# Patient Record
Sex: FEMALE | Race: WHITE | Employment: OTHER | URBAN - METROPOLITAN AREA
[De-identification: names, ages, dates, MRNs, and addresses within clinical notes are randomized per-mention and may not be internally consistent; named-entity substitution may affect disease eponyms.]

---

## 2017-03-21 ENCOUNTER — HOSPITAL ENCOUNTER (OUTPATIENT)
Dept: ULTRASOUND IMAGING | Age: 67
Discharge: HOME | End: 2017-03-21
Attending: OPHTHALMOLOGY
Payer: MEDICARE

## 2017-03-21 DIAGNOSIS — H34.232 RETINAL ARTERIAL BRANCH OCCLUSION, LEFT: ICD-10-CM

## 2017-03-21 PROCEDURE — 93880 EXTRACRANIAL BILAT STUDY: CPT

## 2017-03-22 ENCOUNTER — LAB ENCOUNTER (OUTPATIENT)
Dept: LAB | Facility: HOSPITAL | Age: 67
End: 2017-03-22
Attending: OPHTHALMOLOGY
Payer: MEDICARE

## 2017-03-22 ENCOUNTER — HOSPITAL ENCOUNTER (OUTPATIENT)
Dept: CV DIAGNOSTICS | Facility: HOSPITAL | Age: 67
Discharge: HOME | End: 2017-03-22
Attending: OPHTHALMOLOGY
Payer: MEDICARE

## 2017-03-22 DIAGNOSIS — H34.232 BRANCH RETINAL ARTERY OCCLUSION OF LEFT EYE: Primary | ICD-10-CM

## 2017-03-22 DIAGNOSIS — H34.232 RETINAL ARTERIAL BRANCH OCCLUSION, LEFT: ICD-10-CM

## 2017-03-22 LAB
BASOPHILS # BLD AUTO: 0.06 X10(3) UL (ref 0–0.1)
BASOPHILS NFR BLD AUTO: 0.8 %
C-REACTIVE PROTEIN: 0.79 MG/DL (ref ?–1)
EOSINOPHIL # BLD AUTO: 0.16 X10(3) UL (ref 0–0.3)
EOSINOPHIL NFR BLD AUTO: 2.2 %
ERYTHROCYTE [DISTWIDTH] IN BLOOD BY AUTOMATED COUNT: 14.2 % (ref 11.5–16)
HCT VFR BLD AUTO: 40.6 % (ref 34–50)
HGB BLD-MCNC: 13.8 G/DL (ref 12–16)
IMMATURE GRANULOCYTE COUNT: 0.02 X10(3) UL (ref 0–1)
IMMATURE GRANULOCYTE RATIO %: 0.3 %
LYMPHOCYTES # BLD AUTO: 1.23 X10(3) UL (ref 0.9–4)
LYMPHOCYTES NFR BLD AUTO: 17 %
MCH RBC QN AUTO: 32.4 PG (ref 27–33.2)
MCHC RBC AUTO-ENTMCNC: 34 G/DL (ref 31–37)
MCV RBC AUTO: 95.3 FL (ref 81–100)
MONOCYTES # BLD AUTO: 0.61 X10(3) UL (ref 0.1–0.6)
MONOCYTES NFR BLD AUTO: 8.4 %
NEUTROPHIL ABS PRELIM: 5.14 X10 (3) UL (ref 1.3–6.7)
NEUTROPHILS # BLD AUTO: 5.14 X10(3) UL (ref 1.3–6.7)
NEUTROPHILS NFR BLD AUTO: 71.3 %
PLATELET # BLD AUTO: 243 10(3)UL (ref 150–450)
RBC # BLD AUTO: 4.26 X10(6)UL (ref 3.8–5.1)
RED CELL DISTRIBUTION WIDTH-SD: 49.5 FL (ref 35.1–46.3)
SED RATE-ML: 64 MM/HR (ref 0–25)
WBC # BLD AUTO: 7.2 X10(3) UL (ref 4–13)

## 2017-03-22 PROCEDURE — 93306 TTE W/DOPPLER COMPLETE: CPT

## 2017-03-22 PROCEDURE — 85025 COMPLETE CBC W/AUTO DIFF WBC: CPT

## 2017-03-22 PROCEDURE — 86140 C-REACTIVE PROTEIN: CPT

## 2017-03-22 PROCEDURE — 93306 TTE W/DOPPLER COMPLETE: CPT | Performed by: INTERNAL MEDICINE

## 2017-03-22 PROCEDURE — 85652 RBC SED RATE AUTOMATED: CPT

## 2017-03-22 PROCEDURE — 36415 COLL VENOUS BLD VENIPUNCTURE: CPT

## 2020-09-13 ENCOUNTER — APPOINTMENT (OUTPATIENT)
Dept: CT IMAGING | Age: 70
DRG: 175 | End: 2020-09-13
Attending: EMERGENCY MEDICINE
Payer: MEDICARE

## 2020-09-13 ENCOUNTER — APPOINTMENT (OUTPATIENT)
Dept: CV DIAGNOSTICS | Facility: HOSPITAL | Age: 70
DRG: 175 | End: 2020-09-13
Attending: HOSPITALIST
Payer: MEDICARE

## 2020-09-13 ENCOUNTER — APPOINTMENT (OUTPATIENT)
Dept: NUCLEAR MEDICINE | Facility: HOSPITAL | Age: 70
DRG: 175 | End: 2020-09-13
Attending: INTERNAL MEDICINE
Payer: MEDICARE

## 2020-09-13 ENCOUNTER — APPOINTMENT (OUTPATIENT)
Dept: GENERAL RADIOLOGY | Facility: HOSPITAL | Age: 70
DRG: 175 | End: 2020-09-13
Attending: INTERNAL MEDICINE
Payer: MEDICARE

## 2020-09-13 ENCOUNTER — APPOINTMENT (OUTPATIENT)
Dept: ULTRASOUND IMAGING | Facility: HOSPITAL | Age: 70
DRG: 175 | End: 2020-09-13
Attending: HOSPITALIST
Payer: MEDICARE

## 2020-09-13 ENCOUNTER — HOSPITAL ENCOUNTER (INPATIENT)
Facility: HOSPITAL | Age: 70
LOS: 8 days | Discharge: HOME | DRG: 175 | End: 2020-09-21
Attending: EMERGENCY MEDICINE | Admitting: INTERNAL MEDICINE
Payer: MEDICARE

## 2020-09-13 DIAGNOSIS — I26.09 OTHER ACUTE PULMONARY EMBOLISM WITH ACUTE COR PULMONALE (HCC): ICD-10-CM

## 2020-09-13 DIAGNOSIS — R79.89 POSITIVE D DIMER: ICD-10-CM

## 2020-09-13 DIAGNOSIS — E66.01 MORBID OBESITY WITH BMI OF 50.0-59.9, ADULT (HCC): ICD-10-CM

## 2020-09-13 DIAGNOSIS — R07.9 CHEST PAIN OF UNCERTAIN ETIOLOGY: Primary | ICD-10-CM

## 2020-09-13 DIAGNOSIS — R77.8 ELEVATED TROPONIN: ICD-10-CM

## 2020-09-13 LAB
ALBUMIN SERPL-MCNC: 3.2 G/DL (ref 3.4–5)
ALBUMIN/GLOB SERPL: 0.6 {RATIO} (ref 1–2)
ALP LIVER SERPL-CCNC: 119 U/L (ref 55–142)
ALT SERPL-CCNC: 41 U/L (ref 13–56)
ANION GAP SERPL CALC-SCNC: 13 MMOL/L (ref 0–18)
ANION GAP SERPL CALC-SCNC: 3 MMOL/L (ref 0–18)
APTT PPP: 119.6 SECONDS (ref 25.4–36.1)
APTT PPP: 266.5 SECONDS (ref 25.4–36.1)
APTT PPP: 28.2 SECONDS (ref 25.4–36.1)
AST SERPL-CCNC: 38 U/L (ref 15–37)
ATRIAL RATE: 106 BPM
ATRIAL RATE: 96 BPM
BASOPHILS # BLD AUTO: 0.04 X10(3) UL (ref 0–0.2)
BASOPHILS NFR BLD AUTO: 0.3 %
BILIRUB SERPL-MCNC: 0.7 MG/DL (ref 0.1–2)
BUN BLD-MCNC: 18 MG/DL (ref 7–18)
BUN BLD-MCNC: 21 MG/DL (ref 7–18)
BUN/CREAT SERPL: 16.5 (ref 10–20)
BUN/CREAT SERPL: 17.8 (ref 10–20)
CALCIUM BLD-MCNC: 9.2 MG/DL (ref 8.5–10.1)
CALCIUM BLD-MCNC: 9.4 MG/DL (ref 8.5–10.1)
CHLORIDE SERPL-SCNC: 100 MMOL/L (ref 98–112)
CHLORIDE SERPL-SCNC: 101 MMOL/L (ref 98–112)
CHOLEST SMN-MCNC: 172 MG/DL (ref ?–200)
CO2 SERPL-SCNC: 22 MMOL/L (ref 21–32)
CO2 SERPL-SCNC: 27 MMOL/L (ref 21–32)
CREAT BLD-MCNC: 1.01 MG/DL (ref 0.55–1.02)
CREAT BLD-MCNC: 1.27 MG/DL (ref 0.55–1.02)
D-DIMER: >20 UG/ML FEU (ref ?–0.72)
DEPRECATED RDW RBC AUTO: 49 FL (ref 35.1–46.3)
DEPRECATED RDW RBC AUTO: 50.1 FL (ref 35.1–46.3)
EOSINOPHIL # BLD AUTO: 0.09 X10(3) UL (ref 0–0.7)
EOSINOPHIL NFR BLD AUTO: 0.7 %
ERYTHROCYTE [DISTWIDTH] IN BLOOD BY AUTOMATED COUNT: 14 % (ref 11–15)
ERYTHROCYTE [DISTWIDTH] IN BLOOD BY AUTOMATED COUNT: 14 % (ref 11–15)
GLOBULIN PLAS-MCNC: 5.2 G/DL (ref 2.8–4.4)
GLUCOSE BLD-MCNC: 131 MG/DL (ref 70–99)
GLUCOSE BLD-MCNC: 151 MG/DL (ref 70–99)
HCT VFR BLD AUTO: 42.5 % (ref 35–48)
HCT VFR BLD AUTO: 42.7 % (ref 35–48)
HDLC SERPL-MCNC: 59 MG/DL (ref 40–59)
HGB BLD-MCNC: 13.8 G/DL (ref 12–16)
HGB BLD-MCNC: 14.1 G/DL (ref 12–16)
IMM GRANULOCYTES # BLD AUTO: 0.06 X10(3) UL (ref 0–1)
IMM GRANULOCYTES NFR BLD: 0.5 %
LDLC SERPL CALC-MCNC: 93 MG/DL (ref ?–100)
LYMPHOCYTES # BLD AUTO: 1.94 X10(3) UL (ref 1–4)
LYMPHOCYTES NFR BLD AUTO: 15.5 %
M PROTEIN MFR SERPL ELPH: 8.4 G/DL (ref 6.4–8.2)
MCH RBC QN AUTO: 31.4 PG (ref 26–34)
MCH RBC QN AUTO: 31.5 PG (ref 26–34)
MCHC RBC AUTO-ENTMCNC: 32.5 G/DL (ref 31–37)
MCHC RBC AUTO-ENTMCNC: 33 G/DL (ref 31–37)
MCV RBC AUTO: 95.5 FL (ref 80–100)
MCV RBC AUTO: 96.8 FL (ref 80–100)
MONOCYTES # BLD AUTO: 0.66 X10(3) UL (ref 0.1–1)
MONOCYTES NFR BLD AUTO: 5.3 %
NEUTROPHILS # BLD AUTO: 9.75 X10 (3) UL (ref 1.5–7.7)
NEUTROPHILS # BLD AUTO: 9.75 X10(3) UL (ref 1.5–7.7)
NEUTROPHILS NFR BLD AUTO: 77.7 %
NONHDLC SERPL-MCNC: 113 MG/DL (ref ?–130)
NT-PROBNP SERPL-MCNC: 2585 PG/ML (ref ?–125)
OSMOLALITY SERPL CALC.SUM OF ELEC: 277 MOSM/KG (ref 275–295)
OSMOLALITY SERPL CALC.SUM OF ELEC: 285 MOSM/KG (ref 275–295)
P AXIS: 43 DEGREES
P AXIS: 45 DEGREES
P-R INTERVAL: 202 MS
P-R INTERVAL: 212 MS
PLATELET # BLD AUTO: 197 10(3)UL (ref 150–450)
PLATELET # BLD AUTO: 236 10(3)UL (ref 150–450)
POTASSIUM SERPL-SCNC: 3.6 MMOL/L (ref 3.5–5.1)
POTASSIUM SERPL-SCNC: 4.4 MMOL/L (ref 3.5–5.1)
POTASSIUM SERPL-SCNC: 4.9 MMOL/L (ref 3.5–5.1)
Q-T INTERVAL: 324 MS
Q-T INTERVAL: 370 MS
QRS DURATION: 88 MS
QRS DURATION: 90 MS
QTC CALCULATION (BEZET): 430 MS
QTC CALCULATION (BEZET): 467 MS
R AXIS: 55 DEGREES
R AXIS: 88 DEGREES
RBC # BLD AUTO: 4.39 X10(6)UL (ref 3.8–5.3)
RBC # BLD AUTO: 4.47 X10(6)UL (ref 3.8–5.3)
SARS-COV-2 RNA RESP QL NAA+PROBE: NOT DETECTED
SODIUM SERPL-SCNC: 131 MMOL/L (ref 136–145)
SODIUM SERPL-SCNC: 135 MMOL/L (ref 136–145)
T AXIS: -3 DEGREES
T AXIS: 10 DEGREES
TRIGL SERPL-MCNC: 101 MG/DL (ref 30–149)
TROPONIN I SERPL-MCNC: 0.82 NG/ML (ref ?–0.04)
VENTRICULAR RATE: 106 BPM
VENTRICULAR RATE: 96 BPM
VLDLC SERPL CALC-MCNC: 20 MG/DL (ref 0–30)
WBC # BLD AUTO: 12.5 X10(3) UL (ref 4–11)
WBC # BLD AUTO: 15.4 X10(3) UL (ref 4–11)

## 2020-09-13 PROCEDURE — 93306 TTE W/DOPPLER COMPLETE: CPT | Performed by: HOSPITALIST

## 2020-09-13 PROCEDURE — 93970 EXTREMITY STUDY: CPT | Performed by: HOSPITALIST

## 2020-09-13 PROCEDURE — 78580 LUNG PERFUSION IMAGING: CPT | Performed by: INTERNAL MEDICINE

## 2020-09-13 PROCEDURE — 99223 1ST HOSP IP/OBS HIGH 75: CPT | Performed by: INTERNAL MEDICINE

## 2020-09-13 PROCEDURE — 71250 CT THORAX DX C-: CPT | Performed by: EMERGENCY MEDICINE

## 2020-09-13 PROCEDURE — 99223 1ST HOSP IP/OBS HIGH 75: CPT | Performed by: HOSPITALIST

## 2020-09-13 PROCEDURE — 71045 X-RAY EXAM CHEST 1 VIEW: CPT | Performed by: INTERNAL MEDICINE

## 2020-09-13 RX ORDER — HYDROCHLOROTHIAZIDE 25 MG/1
12.5 TABLET ORAL DAILY
Status: DISCONTINUED | OUTPATIENT
Start: 2020-09-13 | End: 2020-09-14 | Stop reason: SDUPTHER

## 2020-09-13 RX ORDER — MULTIVIT WITH MINERALS/LUTEIN
1000 TABLET ORAL DAILY
COMMUNITY

## 2020-09-13 RX ORDER — IRBESARTAN 300 MG/1
TABLET ORAL NIGHTLY
Status: ON HOLD | COMMUNITY
End: 2020-09-14

## 2020-09-13 RX ORDER — CHLORAL HYDRATE 500 MG
1000 CAPSULE ORAL DAILY
COMMUNITY
End: 2020-10-16

## 2020-09-13 RX ORDER — ONDANSETRON 2 MG/ML
4 INJECTION INTRAMUSCULAR; INTRAVENOUS EVERY 4 HOURS PRN
Status: DISCONTINUED | OUTPATIENT
Start: 2020-09-13 | End: 2020-09-13

## 2020-09-13 RX ORDER — SODIUM CHLORIDE 9 MG/ML
INJECTION, SOLUTION INTRAVENOUS CONTINUOUS
Status: ACTIVE | OUTPATIENT
Start: 2020-09-13 | End: 2020-09-13

## 2020-09-13 RX ORDER — MV,CALC,IRON,MIN/FOLIC/HERB153 3MG-133-33
1 CAPSULE ORAL DAILY
Status: ON HOLD | COMMUNITY
End: 2020-09-21

## 2020-09-13 RX ORDER — ONDANSETRON 2 MG/ML
4 INJECTION INTRAMUSCULAR; INTRAVENOUS EVERY 6 HOURS PRN
Status: DISCONTINUED | OUTPATIENT
Start: 2020-09-13 | End: 2020-09-21

## 2020-09-13 RX ORDER — HEPARIN SODIUM AND DEXTROSE 10000; 5 [USP'U]/100ML; G/100ML
18 INJECTION INTRAVENOUS ONCE
Status: COMPLETED | OUTPATIENT
Start: 2020-09-13 | End: 2020-09-13

## 2020-09-13 RX ORDER — ACETAMINOPHEN 325 MG/1
650 TABLET ORAL EVERY 6 HOURS PRN
Status: DISCONTINUED | OUTPATIENT
Start: 2020-09-13 | End: 2020-09-21

## 2020-09-13 RX ORDER — MULTIVITAMIN
1 TABLET ORAL DAILY
Status: ON HOLD | COMMUNITY
End: 2020-09-21

## 2020-09-13 RX ORDER — POTASSIUM CHLORIDE 20 MEQ/1
40 TABLET, EXTENDED RELEASE ORAL EVERY 4 HOURS
Status: COMPLETED | OUTPATIENT
Start: 2020-09-13 | End: 2020-09-13

## 2020-09-13 RX ORDER — HEPARIN SODIUM AND DEXTROSE 10000; 5 [USP'U]/100ML; G/100ML
INJECTION INTRAVENOUS CONTINUOUS
Status: DISCONTINUED | OUTPATIENT
Start: 2020-09-13 | End: 2020-09-14

## 2020-09-13 RX ORDER — LOSARTAN POTASSIUM 100 MG/1
100 TABLET ORAL DAILY
Status: DISCONTINUED | OUTPATIENT
Start: 2020-09-13 | End: 2020-09-14

## 2020-09-13 RX ORDER — CHOLECALCIFEROL (VITAMIN D3) 50 MCG
5000 TABLET ORAL
COMMUNITY
End: 2020-10-16

## 2020-09-13 RX ORDER — HEPARIN SODIUM 5000 [USP'U]/ML
80 INJECTION INTRAVENOUS; SUBCUTANEOUS ONCE
Status: COMPLETED | OUTPATIENT
Start: 2020-09-13 | End: 2020-09-13

## 2020-09-13 RX ORDER — ASPIRIN 81 MG/1
TABLET, CHEWABLE ORAL DAILY
Status: ON HOLD | COMMUNITY
End: 2020-09-21

## 2020-09-13 RX ORDER — ASPIRIN 81 MG/1
324 TABLET, CHEWABLE ORAL ONCE
Status: COMPLETED | OUTPATIENT
Start: 2020-09-13 | End: 2020-09-13

## 2020-09-13 NOTE — PLAN OF CARE
Patient is A&Ox4. Lung sounds clear bilaterally, S1 and S2 present. K replacement protocol started at 11:15 today for K 3.6.  Multiple attempts to start IV on left arm unsuccessful, only IV access is an 18G on the right wrist. Anesthesia is coming by to hel electrolytes and administer replacement therapy as ordered  Outcome: Progressing     Problem: RESPIRATORY - ADULT  Goal: Achieves optimal ventilation and oxygenation  Description  INTERVENTIONS:  - Assess for changes in respiratory status  - Assess for uzma

## 2020-09-13 NOTE — PLAN OF CARE
ED admit [address] ED. COVID swab walked to lab. Isolation precautions initiated. Upon arrival patient is alert, oriented x4. Forgetful. Pt arrives on heparin gtt. O2 sats > 92% on RA, 97% on 3L, pt winded at rest. SR/ST on the monitor. Denies pain.

## 2020-09-13 NOTE — CONSULTS
[facility]    Inpatient Cardiology Consultation Note    Rylie Human Patient Status:  Inpatient    1950 MRN [id number]   [facility] 8NE-A Attending Ponce Zayas MD   Hosp Day # 0 PCP DO LYNDSEY Barrera formally review echo and discuss options for medical therapy versus lysis    Christine GIL/ARCHIE Cardiology    --------------------------------------------------------------------------------------------------------------------------------  10 (VITAMIN D) 50 MCG (2000 UT) Oral Tab, Take 5,000 mg by mouth. omega-3 fatty acids 1000 MG Oral Cap, Take 1,000 mg by mouth daily. Multiple Vitamins-Minerals (BIO-35 GLUTEN-FREE) Oral Cap, Take by mouth.   Chromium 200 MCG Oral Cap, Take 200 mcg by mouth

## 2020-09-13 NOTE — ED NOTES
While in CT, pt's IV fell out while she was moving around. Writer applied gauze to site upon her return.

## 2020-09-13 NOTE — ED INITIAL ASSESSMENT (HPI)
Left-sided chest pain radiating to her back, shortness of breath, dry cough, weakness, all sxs onset yesterday morning.

## 2020-09-13 NOTE — PROGRESS NOTES
Patient seen and examined. Patient admitted for presumed PE on heparin drip now and stable. H&P from this am reviewed. She is on 3L NC, stable. Still with MENENDEZ/no CP now. On heparin drip.      Echo pending   US legs pending   CTA chest pending

## 2020-09-13 NOTE — ED PROVIDER NOTES
Patient Seen in: THE [facility] Emergency Department In [place]      History   Patient presents with:  Chest Pain Angina  Cough/URI  Fatigue    Stated Complaint: Left-sided chest pain radiating to her back, shortness of breath, dry cough, we*    HPI    Patien above.    Physical Exam     ED Triage Vitals [09/13/20 0141]   /81   Pulse 105   Resp 22   Temp 97.1 °F (36.2 °C)   Temp src Temporal   SpO2 95 %   O2 Device None (Room air)       Current:/77   Pulse 98   Temp 97.1 °F (36.2 °C) (Temporal)   Res orders were created for panel order CBC WITH DIFFERENTIAL WITH PLATELET.   Procedure                               Abnormality         Status                     ---------                               -----------         ------                     CBC W/ D Medication List                       Present on Admission           ICD-10-CM Noted POA    Chest pain of uncertain etiology S22.43 9/13/2020 Unknown

## 2020-09-13 NOTE — H&P
[facility]  History and Physical     [facility] Patient Status:  Emergency    1950 MRN [id number]   Location [facility] Attending Shaji Fitzpatrick MD   Hosp Day # 0 PCP DO Emelyn Julio Propionate (FLONASE NA), by Nasal route., Disp: , Rfl:         Review of Systems:   A comprehensive 14 point review of systems was completed. Pertinent positives and negatives noted in the HPI.     Physical Exam:    /77   Pulse 98   Temp 97.1 °F (3 3. Hyponatremia  1. Monitor   4. H/o breast cancer  5. Morbid obesity  1. BMI 50    Quality:  · DVT Prophylaxis: Heparin gtt  · CODE status: Full  · Ritter: No    Plan of care discussed with patient.      Jeffery Lorenzana DO  9/13/2020

## 2020-09-14 ENCOUNTER — APPOINTMENT (OUTPATIENT)
Dept: ULTRASOUND IMAGING | Facility: HOSPITAL | Age: 70
DRG: 175 | End: 2020-09-14
Attending: INTERNAL MEDICINE
Payer: MEDICARE

## 2020-09-14 ENCOUNTER — APPOINTMENT (OUTPATIENT)
Dept: INTERVENTIONAL RADIOLOGY/VASCULAR | Facility: HOSPITAL | Age: 70
DRG: 175 | End: 2020-09-14
Attending: NURSE PRACTITIONER
Payer: MEDICARE

## 2020-09-14 LAB
ALBUMIN SERPL-MCNC: 2.9 G/DL (ref 3.4–5)
ALBUMIN/GLOB SERPL: 0.6 {RATIO} (ref 1–2)
ALP LIVER SERPL-CCNC: 104 U/L (ref 55–142)
ALT SERPL-CCNC: 33 U/L (ref 13–56)
ANION GAP SERPL CALC-SCNC: 7 MMOL/L (ref 0–18)
APTT PPP: 112.3 SECONDS (ref 25.4–36.1)
APTT PPP: 191.3 SECONDS (ref 25.4–36.1)
APTT PPP: 36.5 SECONDS (ref 25.4–36.1)
AST SERPL-CCNC: 29 U/L (ref 15–37)
BILIRUB SERPL-MCNC: 0.9 MG/DL (ref 0.1–2)
BUN BLD-MCNC: 26 MG/DL (ref 7–18)
BUN/CREAT SERPL: 18.1 (ref 10–20)
CALCIUM BLD-MCNC: 8.9 MG/DL (ref 8.5–10.1)
CHLORIDE SERPL-SCNC: 98 MMOL/L (ref 98–112)
CO2 SERPL-SCNC: 24 MMOL/L (ref 21–32)
CREAT BLD-MCNC: 1.44 MG/DL (ref 0.55–1.02)
DEPRECATED RDW RBC AUTO: 49.1 FL (ref 35.1–46.3)
DEPRECATED RDW RBC AUTO: 49.4 FL (ref 35.1–46.3)
ERYTHROCYTE [DISTWIDTH] IN BLOOD BY AUTOMATED COUNT: 13.9 % (ref 11–15)
ERYTHROCYTE [DISTWIDTH] IN BLOOD BY AUTOMATED COUNT: 14 % (ref 11–15)
FIBRINOGEN: 555 MG/DL (ref 200–446)
GLOBULIN PLAS-MCNC: 5.1 G/DL (ref 2.8–4.4)
GLUCOSE BLD-MCNC: 96 MG/DL (ref 70–99)
HCT VFR BLD AUTO: 37.5 % (ref 35–48)
HCT VFR BLD AUTO: 38.9 % (ref 35–48)
HGB BLD-MCNC: 12.3 G/DL (ref 12–16)
HGB BLD-MCNC: 12.8 G/DL (ref 12–16)
INR BLD: 1.16 (ref 0.86–1.11)
M PROTEIN MFR SERPL ELPH: 8 G/DL (ref 6.4–8.2)
MCH RBC QN AUTO: 31.5 PG (ref 26–34)
MCH RBC QN AUTO: 31.8 PG (ref 26–34)
MCHC RBC AUTO-ENTMCNC: 32.8 G/DL (ref 31–37)
MCHC RBC AUTO-ENTMCNC: 32.9 G/DL (ref 31–37)
MCV RBC AUTO: 95.9 FL (ref 80–100)
MCV RBC AUTO: 96.5 FL (ref 80–100)
OSMOLALITY SERPL CALC.SUM OF ELEC: 273 MOSM/KG (ref 275–295)
PLATELET # BLD AUTO: 189 10(3)UL (ref 150–450)
PLATELET # BLD AUTO: 207 10(3)UL (ref 150–450)
POTASSIUM SERPL-SCNC: 4.2 MMOL/L (ref 3.5–5.1)
PSA SERPL DL<=0.01 NG/ML-MCNC: 15 SECONDS (ref 12.1–14.7)
RBC # BLD AUTO: 3.91 X10(6)UL (ref 3.8–5.3)
RBC # BLD AUTO: 4.03 X10(6)UL (ref 3.8–5.3)
SODIUM SERPL-SCNC: 129 MMOL/L (ref 136–145)
WBC # BLD AUTO: 10.7 X10(3) UL (ref 4–11)
WBC # BLD AUTO: 12 X10(3) UL (ref 4–11)

## 2020-09-14 PROCEDURE — 05H633Z INSERTION OF INFUSION DEVICE INTO LEFT SUBCLAVIAN VEIN, PERCUTANEOUS APPROACH: ICD-10-PCS | Performed by: HOSPITALIST

## 2020-09-14 PROCEDURE — 02HQ33Z INSERTION OF INFUSION DEVICE INTO RIGHT PULMONARY ARTERY, PERCUTANEOUS APPROACH: ICD-10-PCS | Performed by: INTERNAL MEDICINE

## 2020-09-14 PROCEDURE — 02HR33Z INSERTION OF INFUSION DEVICE INTO LEFT PULMONARY ARTERY, PERCUTANEOUS APPROACH: ICD-10-PCS | Performed by: INTERNAL MEDICINE

## 2020-09-14 PROCEDURE — 3E06317 INTRODUCTION OF OTHER THROMBOLYTIC INTO CENTRAL ARTERY, PERCUTANEOUS APPROACH: ICD-10-PCS | Performed by: INTERNAL MEDICINE

## 2020-09-14 PROCEDURE — 93971 EXTREMITY STUDY: CPT | Performed by: INTERNAL MEDICINE

## 2020-09-14 PROCEDURE — 4A023N6 MEASUREMENT OF CARDIAC SAMPLING AND PRESSURE, RIGHT HEART, PERCUTANEOUS APPROACH: ICD-10-PCS | Performed by: INTERNAL MEDICINE

## 2020-09-14 PROCEDURE — 99232 SBSQ HOSP IP/OBS MODERATE 35: CPT | Performed by: INTERNAL MEDICINE

## 2020-09-14 PROCEDURE — 36014 PLACE CATHETER IN ARTERY: CPT | Performed by: INTERNAL MEDICINE

## 2020-09-14 PROCEDURE — B547ZZA ULTRASONOGRAPHY OF LEFT SUBCLAVIAN VEIN, GUIDANCE: ICD-10-PCS | Performed by: HOSPITALIST

## 2020-09-14 PROCEDURE — 99233 SBSQ HOSP IP/OBS HIGH 50: CPT | Performed by: INTERNAL MEDICINE

## 2020-09-14 PROCEDURE — 99152 MOD SED SAME PHYS/QHP 5/>YRS: CPT | Performed by: INTERNAL MEDICINE

## 2020-09-14 PROCEDURE — 37211 THROMBOLYTIC ART THERAPY: CPT | Performed by: INTERNAL MEDICINE

## 2020-09-14 RX ORDER — HYDROCHLOROTHIAZIDE 12.5 MG/1
12.5 CAPSULE, GELATIN COATED ORAL DAILY
Status: DISCONTINUED | OUTPATIENT
Start: 2020-09-15 | End: 2020-09-16

## 2020-09-14 RX ORDER — SODIUM CHLORIDE 9 MG/ML
INJECTION, SOLUTION INTRAVENOUS
Status: DISCONTINUED | OUTPATIENT
Start: 2020-09-15 | End: 2020-09-14 | Stop reason: HOSPADM

## 2020-09-14 RX ORDER — HYDRALAZINE HYDROCHLORIDE 20 MG/ML
10 INJECTION INTRAMUSCULAR; INTRAVENOUS EVERY 6 HOURS PRN
Status: DISCONTINUED | OUTPATIENT
Start: 2020-09-14 | End: 2020-09-16

## 2020-09-14 RX ORDER — LIDOCAINE HYDROCHLORIDE 10 MG/ML
INJECTION, SOLUTION EPIDURAL; INFILTRATION; INTRACAUDAL; PERINEURAL
Status: COMPLETED
Start: 2020-09-14 | End: 2020-09-14

## 2020-09-14 RX ORDER — HEPARIN SODIUM 5000 [USP'U]/ML
INJECTION, SOLUTION INTRAVENOUS; SUBCUTANEOUS
Status: COMPLETED
Start: 2020-09-14 | End: 2020-09-14

## 2020-09-14 RX ORDER — SODIUM CHLORIDE 9 MG/ML
INJECTION, SOLUTION INTRAVENOUS CONTINUOUS
Status: DISCONTINUED | OUTPATIENT
Start: 2020-09-14 | End: 2020-09-15

## 2020-09-14 RX ORDER — IRBESARTAN AND HYDROCHLOROTHIAZIDE 300; 12.5 MG/1; MG/1
1 TABLET, FILM COATED ORAL DAILY
Status: ON HOLD | COMMUNITY
End: 2020-09-21

## 2020-09-14 RX ORDER — MIDAZOLAM HYDROCHLORIDE 1 MG/ML
INJECTION INTRAMUSCULAR; INTRAVENOUS
Status: COMPLETED
Start: 2020-09-14 | End: 2020-09-14

## 2020-09-14 RX ORDER — SODIUM CHLORIDE 9 MG/ML
INJECTION, SOLUTION INTRAVENOUS CONTINUOUS
Status: DISCONTINUED | OUTPATIENT
Start: 2020-09-14 | End: 2020-09-14

## 2020-09-14 RX ORDER — SODIUM CHLORIDE 9 MG/ML
INJECTION, SOLUTION INTRAVENOUS CONTINUOUS
Status: ACTIVE | OUTPATIENT
Start: 2020-09-14 | End: 2020-09-14

## 2020-09-14 NOTE — PROGRESS NOTES
[facility]  Progress Note     Kaley Kelly Patient Status:  Inpatient    1950 MRN [id number]   [facility] 8NE-A Attending Sundeep Vasquez MD   Hosp Day # 1 PCP Vin Welch DO     Chief Complaint: admitted for CP    S: P 09/13/20  0153   TROP 0.817*            Imaging: Imaging data reviewed in Epic. Medications:   • Losartan Potassium  100 mg Oral Daily   • hydrochlorothiazide  12.5 mg Oral Daily       ASSESSMENT / PLAN:     1. Hypoxia - resolved. On RA   1.  Cont hepari

## 2020-09-14 NOTE — PLAN OF CARE
Pt AOx4. O2 sats 91% on RA, MENENDEZ. 2 L NC placed for comfort. NSR on tele. Heparin gtt infusing per PE/DVT protocol. Pt denies pain. Plan for labs in am. Vascular access consult d/t pt extremely hard stick.  Pt updated on plan of care, will continue to [facility] Spirometry  - Assess the need for suctioning and perform as needed  - Assess and instruct to report SOB or any respiratory difficulty  - Respiratory Therapy support as indicated  - Manage/alleviate anxiety  - Monitor for signs/symptoms of CO2 retention  Quynh Bruce

## 2020-09-14 NOTE — PROGRESS NOTES
[facility]  Cardiology Progress Note    Subjective:  No further chest pain or shortness of breath.     Objective:  /58 (BP Location: Left arm)   Pulse 84   Temp 97.6 °F (36.4 °C) (Oral)   Resp 18   Ht 5' 7\" (1.702 m)   Wt (!) 320 lb (145.2 kg) Hg, = 75mm Hg.     Impressions: This study is compared with previous dated 03/22/2017: Right  ventricle dysfunction is new. Assessment:  · Suspected bilateral pulmonary embolism: presented with chest pain.  Elevated DDIMER and abnormal VQ scan suspiciou placement. I discussed risks which include but are not limited to Bleeding, infection, arterial damage to pulmonary A. equipping emergent surgery, PTx, allergic reaction, significant morbidity and mortality.  I also discussed that in light of her L arm ecch

## 2020-09-14 NOTE — PLAN OF CARE
Assumed care at [phone]. VSS. Alert & oriented x4. EKOS catheter x 2 via R IJ. Heparin, tPA, and NaCl infusing per protocol. Breath sounds slightly diminished, on NC 2L. L arm ecchymosis noted with marker, continue to monitor. Labs Q4.  On bed rest. Will check

## 2020-09-14 NOTE — CONSULTS
Hem/Onc Report of Consultation    Patient Name: Rylie Burnham   YOB: 1950   Medical Record Number: [id number]   CSN: [id number]   Consulting Physician: Dr. Gallego Nurse  Referring Provider(s): Dr. Wesley Pablo  Date of Consultation: 9/14/2020     Reason lumpectomy with chemo and rad. Family Medical History:  Family History   Problem Relation Age of Onset   • Breast Cancer Self 46        rt breast cancer. • Breast Cancer Maternal Grandmother 48        In her 52's.        Psychosocial History:  Edelmira Jones UNIT/ML injection 11,600 Units, 80 Units/kg, Intravenous, Once  [COMPLETED] heparin (PORCINE) 78122fdvhi/250mL infusion ED INITIAL DOSE, 18 Units/kg/hr, Intravenous, Once  heparin (PORCINE) drip 76358xzoqx/250mL infusion CONTINUOUS, 200-3,000 Units/hr, Int Temp 97.6 °F (36.4 °C) (Oral)   Resp 18   Ht 1.702 m (5' 7\")   Wt (!) 145.2 kg (320 lb)   SpO2 97%   BMI 50.12 kg/m²     Physical Examination:  General: Patient is alert and oriented x 3, not in acute distress.   Vital Signs: /58 (BP Location: Left a includes the Dose Index Registry. PATIENT STATED HISTORY: (As transcribed by Technologist)  Patient complains left side chest pain,shortness of breath and dry cough for 1 day.          FINDINGS:       Preliminary reading provided by [facility] compressibility, phasicity, and augmentation. OTHER:  Negative. CONCLUSION:  No DVT from the common femoral veins to the popliteal veins.   COVID-19 protocol used.      ===========================    PROCEDURE:  XR CHEST AP PORTABLE  (CPT=71045) right sided lumpectomy, radiation and chemotherapy. Last mammogram in past couple years. Will attempt to retain old records. Dr. John Rivas to see patient.        Electronically Signed by:    Denisse Bennett NP-C  Nurse Practitioner  THE [facility] On[facility]

## 2020-09-14 NOTE — PROGRESS NOTES
Problem: CARDIOVASCULAR - ADULT  Goal: Maintains optimal cardiac output and hemodynamic stability  Description  INTERVENTIONS:  - Monitor vital signs, rhythm, and trends  - Monitor for bleeding, hypotension and signs of decreased cardiac output  - Evaluate meals.  Will continue poc.

## 2020-09-14 NOTE — PROCEDURES
[facility]    [facility] Cardiac Cath Procedure Note  Eric Barahona Patient Status:  Inpatient    1950 MRN [id number]   Location [address] [facility] Attending Deborah Berumen MD   Hosp Day # 1 PCP DO Thomas Barragan PWP catheter was advanced in the R PA, next this catheter was exchanged over a J wire for a 12 cm EKOS catheter. Next the EKOS core was advanced. Next 2mg of TPA was infused in to the drug port of the R EKOS catheter.  Next the PWP catheter was advanced int

## 2020-09-14 NOTE — DIETARY NOTE
[address] [facility]     Admitting diagnosis:  Positive D dimer [R79.89]  Elevated troponin [R79.89]  Chest pain of uncertain etiology [D17.49]    Ht: 170.2 cm (5' 7\")  Wt: (!) 145.2 kg (320 lb).  This is 236% of [facility]  Liana Velez

## 2020-09-15 LAB
ALBUMIN SERPL-MCNC: 2.4 G/DL (ref 3.4–5)
ALBUMIN/GLOB SERPL: 0.5 {RATIO} (ref 1–2)
ALP LIVER SERPL-CCNC: 88 U/L (ref 55–142)
ALT SERPL-CCNC: 23 U/L (ref 13–56)
ANION GAP SERPL CALC-SCNC: 8 MMOL/L (ref 0–18)
APTT PPP: 36.2 SECONDS (ref 25.4–36.1)
APTT PPP: 37.4 SECONDS (ref 25.4–36.1)
APTT PPP: 38.8 SECONDS (ref 25.4–36.1)
APTT PPP: 78.5 SECONDS (ref 25.4–36.1)
AST SERPL-CCNC: 23 U/L (ref 15–37)
BILIRUB SERPL-MCNC: 0.7 MG/DL (ref 0.1–2)
BUN BLD-MCNC: 25 MG/DL (ref 7–18)
BUN/CREAT SERPL: 24 (ref 10–20)
CALCIUM BLD-MCNC: 8.5 MG/DL (ref 8.5–10.1)
CHLORIDE SERPL-SCNC: 103 MMOL/L (ref 98–112)
CO2 SERPL-SCNC: 23 MMOL/L (ref 21–32)
CREAT BLD-MCNC: 1.04 MG/DL (ref 0.55–1.02)
DEPRECATED RDW RBC AUTO: 49.1 FL (ref 35.1–46.3)
DEPRECATED RDW RBC AUTO: 49.4 FL (ref 35.1–46.3)
DEPRECATED RDW RBC AUTO: 51.2 FL (ref 35.1–46.3)
ERYTHROCYTE [DISTWIDTH] IN BLOOD BY AUTOMATED COUNT: 14 % (ref 11–15)
FIBRINOGEN: 494 MG/DL (ref 200–446)
FIBRINOGEN: 515 MG/DL (ref 200–446)
FIBRINOGEN: 526 MG/DL (ref 200–446)
GLOBULIN PLAS-MCNC: 4.5 G/DL (ref 2.8–4.4)
GLUCOSE BLD-MCNC: 89 MG/DL (ref 70–99)
HCT VFR BLD AUTO: 35.8 % (ref 35–48)
HCT VFR BLD AUTO: 36.9 % (ref 35–48)
HCT VFR BLD AUTO: 37.3 % (ref 35–48)
HGB BLD-MCNC: 11.7 G/DL (ref 12–16)
HGB BLD-MCNC: 11.8 G/DL (ref 12–16)
HGB BLD-MCNC: 12.3 G/DL (ref 12–16)
INR BLD: 1.16 (ref 0.86–1.11)
INR BLD: 1.21 (ref 0.86–1.11)
INR BLD: 1.22 (ref 0.86–1.11)
M PROTEIN MFR SERPL ELPH: 6.9 G/DL (ref 6.4–8.2)
MCH RBC QN AUTO: 31.5 PG (ref 26–34)
MCH RBC QN AUTO: 31.5 PG (ref 26–34)
MCH RBC QN AUTO: 31.6 PG (ref 26–34)
MCHC RBC AUTO-ENTMCNC: 32 G/DL (ref 31–37)
MCHC RBC AUTO-ENTMCNC: 32.7 G/DL (ref 31–37)
MCHC RBC AUTO-ENTMCNC: 33 G/DL (ref 31–37)
MCV RBC AUTO: 95.9 FL (ref 80–100)
MCV RBC AUTO: 96.2 FL (ref 80–100)
MCV RBC AUTO: 98.4 FL (ref 80–100)
OSMOLALITY SERPL CALC.SUM OF ELEC: 282 MOSM/KG (ref 275–295)
PLATELET # BLD AUTO: 164 10(3)UL (ref 150–450)
PLATELET # BLD AUTO: 172 10(3)UL (ref 150–450)
PLATELET # BLD AUTO: 178 10(3)UL (ref 150–450)
POTASSIUM SERPL-SCNC: 4.1 MMOL/L (ref 3.5–5.1)
PSA SERPL DL<=0.01 NG/ML-MCNC: 15 SECONDS (ref 12.1–14.7)
PSA SERPL DL<=0.01 NG/ML-MCNC: 15.6 SECONDS (ref 12.1–14.7)
PSA SERPL DL<=0.01 NG/ML-MCNC: 15.7 SECONDS (ref 12.1–14.7)
RBC # BLD AUTO: 3.72 X10(6)UL (ref 3.8–5.3)
RBC # BLD AUTO: 3.75 X10(6)UL (ref 3.8–5.3)
RBC # BLD AUTO: 3.89 X10(6)UL (ref 3.8–5.3)
SODIUM SERPL-SCNC: 134 MMOL/L (ref 136–145)
WBC # BLD AUTO: 10.2 X10(3) UL (ref 4–11)
WBC # BLD AUTO: 8.5 X10(3) UL (ref 4–11)
WBC # BLD AUTO: 9.4 X10(3) UL (ref 4–11)

## 2020-09-15 PROCEDURE — 37214 CESSJ THERAPY CATH REMOVAL: CPT | Performed by: INTERNAL MEDICINE

## 2020-09-15 PROCEDURE — 99223 1ST HOSP IP/OBS HIGH 75: CPT | Performed by: INTERNAL MEDICINE

## 2020-09-15 PROCEDURE — 99233 SBSQ HOSP IP/OBS HIGH 50: CPT | Performed by: INTERNAL MEDICINE

## 2020-09-15 PROCEDURE — 99232 SBSQ HOSP IP/OBS MODERATE 35: CPT | Performed by: INTERNAL MEDICINE

## 2020-09-15 RX ORDER — AMLODIPINE BESYLATE 2.5 MG/1
2.5 TABLET ORAL DAILY
Status: DISCONTINUED | OUTPATIENT
Start: 2020-09-15 | End: 2020-09-21

## 2020-09-15 RX ORDER — HEPARIN SODIUM AND DEXTROSE 10000; 5 [USP'U]/100ML; G/100ML
1600 INJECTION INTRAVENOUS ONCE
Status: COMPLETED | OUTPATIENT
Start: 2020-09-15 | End: 2020-09-15

## 2020-09-15 RX ORDER — FUROSEMIDE 10 MG/ML
40 INJECTION INTRAMUSCULAR; INTRAVENOUS ONCE
Status: DISCONTINUED | OUTPATIENT
Start: 2020-09-15 | End: 2020-09-15

## 2020-09-15 RX ORDER — HEPARIN SODIUM AND DEXTROSE 10000; 5 [USP'U]/100ML; G/100ML
INJECTION INTRAVENOUS CONTINUOUS
Status: DISCONTINUED | OUTPATIENT
Start: 2020-09-15 | End: 2020-09-20

## 2020-09-15 NOTE — DIETARY NOTE
[address] [facility]     Admitting diagnosis:  Positive D dimer [R79.89]  Elevated troponin [R79.89]  Chest pain of uncertain etiology [B74.33]  Pulmonary emboli ([address] [I26.99]    Ht: 170.2 cm (5' 7\")  Wt: (!) 148.6 kg

## 2020-09-15 NOTE — PROGRESS NOTES
[facility]  Cardiology Critical Care Progress Note    Edinson Gaines Patient Status:  Inpatient    1950 MRN [id number]   [facility] 6NE-A Attending Zoë Harper MD   Hosp Day # 2 PCP Germain Lam DO     Seen earlier this a therapy- symptomatically improved post lytics. PASP improved to 37 mmHg (was 70-75 by echo)  2. Elevated trop- secondary to above. Echo with preserved lvef, no rwma  3. Obesity  4.  Hx remote breast ca    Plan:     · IV heparin with plans for eventual trans

## 2020-09-15 NOTE — PROGRESS NOTES
EKOS Catheter removal Note:    Procedure:  - Removal of b/l EKOS catheters    Hemodynamics:    PA 37/19    Description of procedure:    Verbal consent was obtained from the patient. The Heparin and TPA had been stopped.  Next the dressings were removed, are

## 2020-09-15 NOTE — PLAN OF CARE
Assumed care of patient @8627. Patient A/Ox4. On Room air. Denies pain or SOB. EKOS, lines are intact, TPA, heparin. No new signs of bleeding, bruising on L arm is still marked and unchanged.  Patient and spouse updated on plan of care, no concerns at this

## 2020-09-15 NOTE — PROGRESS NOTES
[facility]  Progress Note     Sharif Arrow Patient Status:  Inpatient    1950 MRN [id number]   [facility] 6NE-A Attending Celian Loyd MD   Hosp Day # 2 PCP Steve Siegel DO     Chief Complaint: PE    S: Patient overal 8.0 6.9       Estimated Creatinine Clearance: 47.5 mL/min (A) (based on SCr of 1.04 mg/dL (H)).     Recent Labs   Lab 09/15/20  0026 09/15/20  0415 09/15/20  0746   PTP 15.0* 15.7* 15.6*   INR 1.16* 1.22* 1.21*       Recent Labs   Lab 09/13/20  0153   TROP

## 2020-09-15 NOTE — PLAN OF CARE
Assumed pt care at 0730. Pt a/o x4. VSS. NSR. Pt denies pain. EKOS d/c'd by cardiologist this am. Sheaths d/c'd 1hr after, no complications. Tolerating diet. Pt up to chair and ambulating in room, independent.  Heparin gtt resumed per protocol 1 hr post she

## 2020-09-16 LAB
ANION GAP SERPL CALC-SCNC: 5 MMOL/L (ref 0–18)
APTT PPP: 100.5 SECONDS (ref 25.4–36.1)
BUN BLD-MCNC: 28 MG/DL (ref 7–18)
BUN/CREAT SERPL: 23.7 (ref 10–20)
CALCIUM BLD-MCNC: 8.7 MG/DL (ref 8.5–10.1)
CHLORIDE SERPL-SCNC: 103 MMOL/L (ref 98–112)
CO2 SERPL-SCNC: 27 MMOL/L (ref 21–32)
CREAT BLD-MCNC: 1.18 MG/DL (ref 0.55–1.02)
DEPRECATED RDW RBC AUTO: 50.4 FL (ref 35.1–46.3)
ERYTHROCYTE [DISTWIDTH] IN BLOOD BY AUTOMATED COUNT: 14 % (ref 11–15)
GLUCOSE BLD-MCNC: 88 MG/DL (ref 70–99)
HCT VFR BLD AUTO: 37.9 % (ref 35–48)
HGB BLD-MCNC: 12.1 G/DL (ref 12–16)
MCH RBC QN AUTO: 31.6 PG (ref 26–34)
MCHC RBC AUTO-ENTMCNC: 31.9 G/DL (ref 31–37)
MCV RBC AUTO: 99 FL (ref 80–100)
OSMOLALITY SERPL CALC.SUM OF ELEC: 285 MOSM/KG (ref 275–295)
PLATELET # BLD AUTO: 179 10(3)UL (ref 150–450)
POTASSIUM SERPL-SCNC: 4.2 MMOL/L (ref 3.5–5.1)
RBC # BLD AUTO: 3.83 X10(6)UL (ref 3.8–5.3)
SODIUM SERPL-SCNC: 135 MMOL/L (ref 136–145)
WBC # BLD AUTO: 11.1 X10(3) UL (ref 4–11)

## 2020-09-16 PROCEDURE — 99232 SBSQ HOSP IP/OBS MODERATE 35: CPT | Performed by: CLINICAL NURSE SPECIALIST

## 2020-09-16 PROCEDURE — 99233 SBSQ HOSP IP/OBS HIGH 50: CPT | Performed by: HOSPITALIST

## 2020-09-16 PROCEDURE — 99232 SBSQ HOSP IP/OBS MODERATE 35: CPT | Performed by: INTERNAL MEDICINE

## 2020-09-16 RX ORDER — WARFARIN SODIUM 10 MG/1
10 TABLET ORAL NIGHTLY
Status: DISCONTINUED | OUTPATIENT
Start: 2020-09-16 | End: 2020-09-18

## 2020-09-16 NOTE — PROGRESS NOTES
Hem/Onc Inpatient  Note    Patient Name: Lan August   YOB: 1950   Medical Record Number: [id number]   CSN: [id number]      Chief Complaint:  PE with history of breast cancer     S:   Breathing improved  Allergies:    Demerol Hcl [Meperi* 09/15/20  0415 09/16/20  0417   *   < > 96 89 88   BUN 18   < > 26* 25* 28*   CREATSERUM 1.01   < > 1.44* 1.04* 1.18*   GFRAA 64   < > 42* 62 53*   GFRNAA 56*   < > 36* 54* 46*   CA 9.4   < > 8.9 8.5 8.7   ALB 3.2*  --  2.9* 2.4*  --    *   < heparin with 2 day overlap INR>2. Will get daily INR and titrate Coumadin. Patient aware of extended hospitalization   2.  History of breast cancer: Clinically CHRISTA     Discussed with Dr Nancy Gabriel       Electronically Signed by:    Timmy Torre ANP-BC  Nurse Pr

## 2020-09-16 NOTE — PROGRESS NOTES
[facility]  Progress Note     Komal Steve Patient Status:  Inpatient    1950 MRN [id number]   [facility] 6NE-A Attending Fernandez Tiwari MD   Hosp Day # 3 PCP Kirk Phillips DO     Chief Complaint: PE    S: Patient is doi this interval not displayed. Estimated Creatinine Clearance: 41.9 mL/min (A) (based on SCr of 1.18 mg/dL (H)).     Recent Labs   Lab 09/15/20  0026 09/15/20  0415 09/15/20  0746   PTP 15.0* 15.7* 15.6*   INR 1.16* 1.22* 1.21*       Recent Labs   Lab 0

## 2020-09-16 NOTE — PLAN OF CARE
Assumed patient care this morning around 0730 am. Patient is alert and oriented sitting up in the chair. Heparin gtt per PE protocol, PTT therapeutic at 1600 units/hr. Patient is on room air, lung sounds are diminished. IS encouraged patient reaching 1250. Oxygen supplementation based on oxygen saturation or ABGs  - Provide Smoking Cessation handout, if applicable  - Encourage broncho-pulmonary hygiene including cough, deep breathe, Incentive Spirometry  - Assess the need for suctioning and perform as needed

## 2020-09-16 NOTE — PROGRESS NOTES
Received patient at 1930  A/O x 4  NSR on tele  Maintaining sats on RA  SBP stable  Heparin gtt infusing, ptt therapeutic  Hematology to address [facility]  Transfer orders for the floor  Will continue to monitor

## 2020-09-16 NOTE — CM/SW NOTE
Care Progression Note:  Active Acute Medical Issue:   Pulmonary embolus ([address]- submassive, s/p EKOS- transition to coumadin, on heparin gtt  Hypoxia-resolved, on RA  L arm ecchymosis- negative US  Elevated troponin- secondary to PE    Other Contributing Me

## 2020-09-17 LAB
APTT PPP: 92.3 SECONDS (ref 25.4–36.1)
INR BLD: 1.13 (ref 0.89–1.11)
PLATELET # BLD AUTO: 187 10(3)UL (ref 150–450)
PSA SERPL DL<=0.01 NG/ML-MCNC: 14.9 SECONDS (ref 12.4–14.6)

## 2020-09-17 PROCEDURE — 99232 SBSQ HOSP IP/OBS MODERATE 35: CPT | Performed by: INTERNAL MEDICINE

## 2020-09-17 PROCEDURE — 99233 SBSQ HOSP IP/OBS HIGH 50: CPT | Performed by: HOSPITALIST

## 2020-09-17 RX ORDER — HYDROCHLOROTHIAZIDE 12.5 MG/1
12.5 CAPSULE, GELATIN COATED ORAL DAILY
Status: DISCONTINUED | OUTPATIENT
Start: 2020-09-18 | End: 2020-09-21

## 2020-09-17 RX ORDER — HYDROCHLOROTHIAZIDE 25 MG/1
12.5 TABLET ORAL DAILY
Status: DISCONTINUED | OUTPATIENT
Start: 2020-09-17 | End: 2020-09-17

## 2020-09-17 NOTE — PLAN OF CARE
Assumed care of patient at [address] [place] [address]. Patient is alert and oriented. Denies pain or nausea. Generalized edema noted. Bruising noted to left arm. Heparin drip infusing at 16ml/hr. Received first dose of Coumadin this evening.  Ambulates with stand by assist. Plan difficulty  - Respiratory Therapy support as indicated  - Manage/alleviate anxiety  - Monitor for signs/symptoms of CO2 retention  Outcome: Progressing

## 2020-09-17 NOTE — PLAN OF CARE
Assumed care at 0730  Patient alert, oriented x4  Ambulated in halls.  Up in chair  Heparin drip infusing  Coumadin tonight  Denies pain    Plan of care discussed with patient and spouse

## 2020-09-17 NOTE — PROGRESS NOTES
[facility] Cardiology   Progress Note     Patient Status:  Inpatient    1950 MRN [id number]   [facility] 7NE-A Attending Deven Muir MD   Hosp Day # 3 PCP Conor Workman, DO         A:  - B/L PE with evidence of Right hea HGB 12.1 09/16/2020    HCT 37.9 09/16/2020    .0 09/16/2020     Lab Results   Component Value Date    INR 1.21 (H) 09/15/2020     Lab Results   Component Value Date     09/16/2020    K 4.2 09/16/2020     09/16/2020    CO2 27.0 09/16/2020

## 2020-09-17 NOTE — PLAN OF CARE
Received pt at 1300  Pt NSR on tele, RA  Heparin infusing at 16ml/hr, ptt therapeutic redraw in am, warfarin to start tonight  LUE swollen, ecchymotic  Dressing on R IJ removed in evening by MD  Stand by assist   Remains MENENDEZ but recovers quickly  Pt and fa

## 2020-09-17 NOTE — PROGRESS NOTES
[facility]  Progress Note     Sammie Mora Patient Status:  Inpatient    1950 MRN [id number]   [facility] 6NE-A Attending Rachana Davis MD   Hosp Day # 4 PCP Deepa Reza DO     Chief Complaint: PE    S: Patient feels --  0.9 0.7  --    TP 8.4*  --  8.0 6.9  --     < > = values in this interval not displayed. Estimated Creatinine Clearance: 41.9 mL/min (A) (based on SCr of 1.18 mg/dL (H)). Recent Labs   Lab 09/15/20  0415 09/15/20  0746 09/17/20  0536   [facility] 15.

## 2020-09-17 NOTE — PROGRESS NOTES
Heme/Onc Progress Note - [facility]      Chief Complaint:    Follow up for evaluation and management of pulmonary embolism with h/o breast cancer. Interim History:      She had warfarin 10 mg yesterday. She is on UFH.  She has a left arm ecchymosis that is sta 41  --  33 23  --    BILT 0.7  --  0.9 0.7  --    TP 8.4*  --  8.0 6.9  --     < > = values in this interval not displayed.        Radiologic imaging reviewed at this visit:    US Venous doppler on 9/14/2020:  FINDINGS:    EXTREMITY:  Left upper  THROMBI:

## 2020-09-18 LAB
APTT PPP: 103.4 SECONDS (ref 25.4–36.1)
INR BLD: 1.42 (ref 0.89–1.11)
PLATELET # BLD AUTO: 199 10(3)UL (ref 150–450)
PSA SERPL DL<=0.01 NG/ML-MCNC: 17.8 SECONDS (ref 12.4–14.6)

## 2020-09-18 PROCEDURE — 99232 SBSQ HOSP IP/OBS MODERATE 35: CPT | Performed by: INTERNAL MEDICINE

## 2020-09-18 PROCEDURE — 99233 SBSQ HOSP IP/OBS HIGH 50: CPT | Performed by: HOSPITALIST

## 2020-09-18 RX ORDER — WARFARIN SODIUM 7.5 MG/1
7.5 TABLET ORAL NIGHTLY
Status: DISCONTINUED | OUTPATIENT
Start: 2020-09-18 | End: 2020-09-20

## 2020-09-18 NOTE — CM/SW NOTE
Care Progression Note:  Active Acute Medical Issue:   Pulmonary embolus ([address]- submassive, s/p EKOS- on coumadin, sub therapeutic INR. Remains on heparin gtt until therapeutic INR achieved.   Hypoxia-resolved, on RA  L arm ecchymosis- negative US  Elevated

## 2020-09-18 NOTE — PLAN OF CARE
Assumed care at 0700. Pt A/O x4. RA. NSR on tele. VSS. Denies any pain. Denies any SOB. Up x1 with the walker to the bathroom. Walked in the halls during the day. Heparin gtt infusing at 16 ml/hr. Next PTT tomorrow morning. Bridging to coumadin. INR 1.4.  P

## 2020-09-18 NOTE — PROGRESS NOTES
[facility]  Progress Note     Gera Lager Patient Status:  Inpatient    1950 MRN [id number]   [facility] 6NE-A Attending Carlos Guy MD   Hosp Day # 5 PCP Kaya Figures, DO     Chief Complaint: PE    S: Patient has no ALT 41  --  33 23  --    BILT 0.7  --  0.9 0.7  --    TP 8.4*  --  8.0 6.9  --     < > = values in this interval not displayed. Estimated Creatinine Clearance: 41.9 mL/min (A) (based on SCr of 1.18 mg/dL (H)).     Recent Labs   Lab 09/15/20  0746 09

## 2020-09-18 NOTE — PLAN OF CARE
Patient is alert and oriented. Denies pain or nausea. No complaints of shortness of breath. Oxygen saturation remains above 90% on room air. NSR on telemetry. Ambulates with stand by assist. Heparin drip infusing at 16ml/hr. Recheck of PTT pending.  Plan to oxygenation  Description  INTERVENTIONS:  - Assess for changes in respiratory status  - Assess for changes in mentation and behavior  - Position to facilitate oxygenation and minimize respiratory effort  - Oxygen supplementation based on oxygen saturation

## 2020-09-18 NOTE — PROGRESS NOTES
Heme/Onc Progress Note - [facility]      Chief Complaint:    Follow up for evaluation and management of pulmonary embolism with h/o breast cancer. Interim History:      She had warfarin 10 mg x 2 days. She is on UFH. The INR has increased to 1.4.  She has a le CO2 22.0   < > 24.0 23.0 27.0   ALKPHO 119  --  104 88  --    AST 38*  --  29 23  --    ALT 41  --  33 23  --    BILT 0.7  --  0.9 0.7  --    TP 8.4*  --  8.0 6.9  --     < > = values in this interval not displayed.      Recent Labs   Lab 09/15/20  6683

## 2020-09-19 LAB
ANION GAP SERPL CALC-SCNC: 4 MMOL/L (ref 0–18)
APTT PPP: 102.5 SECONDS (ref 25.4–36.1)
APTT PPP: 105.1 SECONDS (ref 25.4–36.1)
APTT PPP: 194.2 SECONDS (ref 25.4–36.1)
BUN BLD-MCNC: 23 MG/DL (ref 7–18)
BUN/CREAT SERPL: 23.2 (ref 10–20)
CALCIUM BLD-MCNC: 8.9 MG/DL (ref 8.5–10.1)
CHLORIDE SERPL-SCNC: 100 MMOL/L (ref 98–112)
CO2 SERPL-SCNC: 28 MMOL/L (ref 21–32)
CREAT BLD-MCNC: 0.99 MG/DL
DEPRECATED RDW RBC AUTO: 48.2 FL (ref 35.1–46.3)
ERYTHROCYTE [DISTWIDTH] IN BLOOD BY AUTOMATED COUNT: 14.2 % (ref 11–15)
GLUCOSE BLD-MCNC: 91 MG/DL (ref 70–99)
HCT VFR BLD AUTO: 33.8 %
HGB BLD-MCNC: 11.4 G/DL
INR BLD: 2.58 (ref 0.89–1.11)
INR BLD: 2.63 (ref 0.89–1.11)
MCH RBC QN AUTO: 31.8 PG (ref 26–34)
MCHC RBC AUTO-ENTMCNC: 33.7 G/DL (ref 31–37)
MCV RBC AUTO: 94.2 FL
OSMOLALITY SERPL CALC.SUM OF ELEC: 277 MOSM/KG (ref 275–295)
PLATELET # BLD AUTO: 200 10(3)UL (ref 150–450)
POTASSIUM SERPL-SCNC: 3.9 MMOL/L (ref 3.5–5.1)
PSA SERPL DL<=0.01 NG/ML-MCNC: 28.3 SECONDS (ref 12.4–14.6)
PSA SERPL DL<=0.01 NG/ML-MCNC: 28.7 SECONDS (ref 12.4–14.6)
RBC # BLD AUTO: 3.59 X10(6)UL
SODIUM SERPL-SCNC: 132 MMOL/L (ref 136–145)
WBC # BLD AUTO: 9.1 X10(3) UL (ref 4–11)

## 2020-09-19 PROCEDURE — 99232 SBSQ HOSP IP/OBS MODERATE 35: CPT | Performed by: HOSPITALIST

## 2020-09-19 PROCEDURE — 99232 SBSQ HOSP IP/OBS MODERATE 35: CPT | Performed by: INTERNAL MEDICINE

## 2020-09-19 NOTE — PROGRESS NOTES
Heme/Onc Progress Note       Chief Complaint:    Follow up for evaluation and management of pulmonary embolism with h/o breast cancer. Interim History:      She had warfarin 10 mg x 2 days and then 7.5mg. She is on UFH. The INR has increased to 2.58.  Sh 129* 134* 135* 132*   K 3.6   < > 4.2 4.1 4.2 3.9      < > 98 103 103 100   CO2 22.0   < > 24.0 23.0 27.0 28.0   ALKPHO 119  --  104 88  --   --    AST 38*  --  29 23  --   --    ALT 41  --  33 23  --   --    BILT 0.7  --  0.9 0.7  --   --    TP 8.4*

## 2020-09-19 NOTE — PLAN OF CARE
Assumed care at 0700. Pt A/O x4. MAGGIE FONTENOT on tele. VSS. Complaining of pain in her R leg. Pt states, \"I pulled a muscel in my leg yesterday and now it hurts\". PRN Tylenol given with relief. Denies any SOB. Up x1 with the walker to the bathroom.  Walked in

## 2020-09-19 NOTE — PROGRESS NOTES
[facility]  Progress Note     Komal Wilson Patient Status:  Inpatient    1950 MRN [id number]   [facility] 6NE-A Attending Fernandez Tiwari MD   Hosp Day # 6 PCP Kirk Phillips DO     Chief Complaint: PE    S: Patient didn't CO2 22.0   < > 24.0 23.0 27.0 28.0   ALKPHO 119  --  104 88  --   --    AST 38*  --  29 23  --   --    ALT 41  --  33 23  --   --    BILT 0.7  --  0.9 0.7  --   --    TP 8.4*  --  8.0 6.9  --   --     < > = values in this interval not displayed.        Es

## 2020-09-20 ENCOUNTER — APPOINTMENT (OUTPATIENT)
Dept: CT IMAGING | Facility: HOSPITAL | Age: 70
DRG: 175 | End: 2020-09-20
Attending: HOSPITALIST
Payer: MEDICARE

## 2020-09-20 LAB
APTT PPP: 143.5 SECONDS (ref 25.4–36.1)
APTT PPP: 74.9 SECONDS (ref 25.4–36.1)
INR BLD: 3.51 (ref 0.89–1.11)
PLATELET # BLD AUTO: 234 10(3)UL (ref 150–450)
PSA SERPL DL<=0.01 NG/ML-MCNC: 36 SECONDS (ref 12.4–14.6)

## 2020-09-20 PROCEDURE — 72192 CT PELVIS W/O DYE: CPT | Performed by: HOSPITALIST

## 2020-09-20 PROCEDURE — 99232 SBSQ HOSP IP/OBS MODERATE 35: CPT | Performed by: INTERNAL MEDICINE

## 2020-09-20 PROCEDURE — 99233 SBSQ HOSP IP/OBS HIGH 50: CPT | Performed by: HOSPITALIST

## 2020-09-20 RX ORDER — WARFARIN SODIUM 5 MG/1
5 TABLET ORAL NIGHTLY
Status: DISCONTINUED | OUTPATIENT
Start: 2020-09-20 | End: 2020-09-21

## 2020-09-20 NOTE — PROGRESS NOTES
Heme/Onc Progress Note       Chief Complaint:    Follow up for evaluation and management of pulmonary embolism with h/o breast cancer. Interim History:      She had warfarin 10 mg x 2 days and then 7.5mgx2. She is on UFH. The INR has increased to 3.5.  Gopal Bergeron Lab 09/19/20  0554 09/19/20  0835 09/19/20  1415 09/20/20  0534   INR 2.58* 2.63*  --  3.51*   .2* 105.1* 102.5* 143.5*        Radiologic imaging reviewed at this visit:    US Venous doppler on 9/14/2020:  FINDINGS:    EXTREMITY:  Left upper  THRO

## 2020-09-20 NOTE — PROGRESS NOTES
[facility]  Progress Note     Suha Aguirre Patient Status:  Inpatient    1950 MRN [id number]   [facility] 6NE-A Attending Matthew Arenas MD   Hosp Day # 7 PCP Dania Jolley DO     Chief Complaint: PE    S: Patient has c/ 23  --   --    ALT 33 23  --   --    BILT 0.9 0.7  --   --    TP 8.0 6.9  --   --        Estimated Creatinine Clearance: 50 mL/min (based on SCr of 0.99 mg/dL).     Recent Labs   Lab 09/19/20  0554 09/19/20  0835 09/20/20  0534   PTP 28.3* 28.7* 36.0*   INR

## 2020-09-20 NOTE — PLAN OF CARE
Assumed care. Heparin gtt infusing per protocol. PTT elevated this am. Adjusted and redraw 6 hrs later. Still c/o pain in right thigh area. Tylenol given.

## 2020-09-20 NOTE — PLAN OF CARE
Assumed pt care at 0730  VSS  Pt complaint of right groin pain, PRN Tylenol given w/relief.  MD notified  Pelvic CT- hematoma- heparin gt d/c'ed per hospitalist  Left midline c/d/I  Possible discharge tomorrow  POC discussed with pt and spouse  Needs attend respiratory status  - Assess for changes in mentation and behavior  - Position to facilitate oxygenation and minimize respiratory effort  - Oxygen supplementation based on oxygen saturation or ABGs  - Provide Smoking Cessation handout, if applicable  - Enc

## 2020-09-21 VITALS
OXYGEN SATURATION: 95 % | TEMPERATURE: 99 F | HEIGHT: 67 IN | WEIGHT: 293 LBS | SYSTOLIC BLOOD PRESSURE: 121 MMHG | HEART RATE: 89 BPM | DIASTOLIC BLOOD PRESSURE: 69 MMHG | RESPIRATION RATE: 18 BRPM | BODY MASS INDEX: 45.99 KG/M2

## 2020-09-21 DIAGNOSIS — I26.09 OTHER ACUTE PULMONARY EMBOLISM WITH ACUTE COR PULMONALE (HCC): Primary | ICD-10-CM

## 2020-09-21 LAB
APTT PPP: 53.5 SECONDS (ref 25.4–36.1)
INR BLD: 3.77 (ref 0.89–1.11)
PSA SERPL DL<=0.01 NG/ML-MCNC: 38.1 SECONDS (ref 12.4–14.6)

## 2020-09-21 PROCEDURE — 99239 HOSP IP/OBS DSCHRG MGMT >30: CPT | Performed by: HOSPITALIST

## 2020-09-21 PROCEDURE — 99232 SBSQ HOSP IP/OBS MODERATE 35: CPT | Performed by: INTERNAL MEDICINE

## 2020-09-21 RX ORDER — WARFARIN SODIUM 2.5 MG/1
2.5 TABLET ORAL NIGHTLY
Qty: 90 TABLET | Refills: 5 | Status: SHIPPED | OUTPATIENT
Start: 2020-09-21 | End: 2020-11-25

## 2020-09-21 RX ORDER — WARFARIN SODIUM 2.5 MG/1
2.5 TABLET ORAL NIGHTLY
Status: DISCONTINUED | OUTPATIENT
Start: 2020-09-21 | End: 2020-09-21

## 2020-09-21 RX ORDER — IRBESARTAN AND HYDROCHLOROTHIAZIDE 150; 12.5 MG/1; MG/1
1 TABLET, FILM COATED ORAL DAILY
Qty: 30 TABLET | Refills: 0 | Status: SHIPPED | OUTPATIENT
Start: 2020-09-21 | End: 2020-10-16

## 2020-09-21 NOTE — PROGRESS NOTES
Heme/Onc Progress Note - [facility]      Chief Complaint:    Follow up for evaluation and management of pulmonary embolism with h/o breast cancer. Interim History:      She had warfarin 10 mg, 10 mg, 7.5 mg, 5 mg, 5 mg.  Her INR today is greater than 3 at 3.77 09/19/20  0835 09/19/20  0835 09/20/20  0534 09/20/20  1301 09/21/20  0458   INR 2.63*  --  3.51*  --  3.77*   .1*   < > 143.5* 74.9* 53.5*    < > = values in this interval not displayed.         Radiologic imaging reviewed at this visit:    Gifty Granados

## 2020-09-21 NOTE — PLAN OF CARE
NURSING DISCHARGE NOTE    Assumed pt care at 0730  VSS  Pt states R.  Groin pain is much improved  Ok to dc per all consults    IV removed  Tele removed    Medications, follow ups and education reviewed with patient  Questions and concerns answered  Pt

## 2020-09-21 NOTE — PLAN OF CARE
Assumed care at [address] [facility]. Patient A&Ox4. Tele SR, on room air. Denies pain. Coumadin given as ordered. Possible discharge tomorrow. Midline IV dressing change due, patient refused d/t possible discharge. Will cont to monitor.

## 2020-09-23 ENCOUNTER — ANTI-COAG VISIT (OUTPATIENT)
Dept: HEMATOLOGY/ONCOLOGY | Age: 70
End: 2020-09-23
Attending: INTERNAL MEDICINE
Payer: MEDICARE

## 2020-09-23 DIAGNOSIS — I26.09 OTHER ACUTE PULMONARY EMBOLISM WITH ACUTE COR PULMONALE (HCC): ICD-10-CM

## 2020-09-23 LAB — INR: 3.4 (ref 0.8–1.3)

## 2020-09-23 PROCEDURE — 36416 COLLJ CAPILLARY BLOOD SPEC: CPT

## 2020-09-23 PROCEDURE — 85610 PROTHROMBIN TIME: CPT

## 2020-09-30 ENCOUNTER — ANTI-COAG VISIT (OUTPATIENT)
Dept: HEMATOLOGY/ONCOLOGY | Age: 70
End: 2020-09-30
Attending: INTERNAL MEDICINE
Payer: MEDICARE

## 2020-09-30 DIAGNOSIS — I26.09 OTHER ACUTE PULMONARY EMBOLISM WITH ACUTE COR PULMONALE (HCC): ICD-10-CM

## 2020-09-30 LAB — INR: 1.9 (ref 0.8–1.3)

## 2020-09-30 PROCEDURE — 85610 PROTHROMBIN TIME: CPT

## 2020-09-30 PROCEDURE — 36416 COLLJ CAPILLARY BLOOD SPEC: CPT

## 2020-09-30 NOTE — DISCHARGE SUMMARY
[facility] [place] HOSPITALIST  DISCHARGE SUMMARY     Leslie Contreras Patient Status:  Inpatient    1950 MRN [id number]   [facility] 7NE-A Attending No att. providers found   [address] Day [address] PCP Skyler Santiago DO     Date of Admission: 2020 doctor. Original instructions: Take 1 tablet (2.5 mg total) by mouth nightly.  Adjust dosing based on warfarin clinic monitoring   Quantity: 90 tablet  Refills: 5        CONTINUE taking these medications      Instructions Prescription details   Chromium 20 Date Arrival Time Visit Type Length Department Provider     9/30/2020 10:10 AM  PT/INR [1981] 15 min [facility] in [address] [place] [address]    Patient Instructions:         Location Instructions:      Your appointment is scheduled at the [facility], we have put the following visitor restrictions in place during the Coronavirus outbreak.   -Visitors 18 years and under are not allowed at the [facility]. -Only one visitor is allowed to accompany you to a physician visit. -No visitors are allowe

## 2020-10-07 ENCOUNTER — ANTI-COAG VISIT (OUTPATIENT)
Dept: HEMATOLOGY/ONCOLOGY | Age: 70
End: 2020-10-07
Attending: INTERNAL MEDICINE
Payer: MEDICARE

## 2020-10-07 DIAGNOSIS — I26.09 OTHER ACUTE PULMONARY EMBOLISM WITH ACUTE COR PULMONALE (HCC): ICD-10-CM

## 2020-10-07 PROCEDURE — 36416 COLLJ CAPILLARY BLOOD SPEC: CPT

## 2020-10-07 PROCEDURE — 85610 PROTHROMBIN TIME: CPT

## 2020-10-14 ENCOUNTER — ANTI-COAG VISIT (OUTPATIENT)
Dept: HEMATOLOGY/ONCOLOGY | Age: 70
End: 2020-10-14
Attending: INTERNAL MEDICINE
Payer: MEDICARE

## 2020-10-14 DIAGNOSIS — I26.09 OTHER ACUTE PULMONARY EMBOLISM WITH ACUTE COR PULMONALE (HCC): ICD-10-CM

## 2020-10-14 PROCEDURE — 36416 COLLJ CAPILLARY BLOOD SPEC: CPT

## 2020-10-14 PROCEDURE — 85610 PROTHROMBIN TIME: CPT

## 2020-10-16 ENCOUNTER — OFFICE VISIT (OUTPATIENT)
Dept: HEMATOLOGY/ONCOLOGY | Age: 70
End: 2020-10-16
Attending: INTERNAL MEDICINE
Payer: MEDICARE

## 2020-10-16 VITALS
SYSTOLIC BLOOD PRESSURE: 139 MMHG | TEMPERATURE: 97 F | RESPIRATION RATE: 20 BRPM | BODY MASS INDEX: 45.99 KG/M2 | HEART RATE: 94 BPM | HEIGHT: 67 IN | WEIGHT: 293 LBS | OXYGEN SATURATION: 95 % | DIASTOLIC BLOOD PRESSURE: 78 MMHG

## 2020-10-16 DIAGNOSIS — Z85.3 HISTORY OF BREAST CANCER IN FEMALE: ICD-10-CM

## 2020-10-16 DIAGNOSIS — E66.01 MORBID OBESITY WITH BMI OF 50.0-59.9, ADULT (HCC): ICD-10-CM

## 2020-10-16 DIAGNOSIS — I26.09 OTHER ACUTE PULMONARY EMBOLISM WITH ACUTE COR PULMONALE (HCC): Primary | ICD-10-CM

## 2020-10-16 PROCEDURE — 99214 OFFICE O/P EST MOD 30 MIN: CPT | Performed by: INTERNAL MEDICINE

## 2020-10-16 RX ORDER — IRBESARTAN AND HYDROCHLOROTHIAZIDE 150; 12.5 MG/1; MG/1
1 TABLET, FILM COATED ORAL DAILY
Qty: 30 TABLET | Refills: 3 | Status: SHIPPED | OUTPATIENT
Start: 2020-10-16

## 2020-10-16 RX ORDER — VITS A,C,E/LUTEIN/MINERALS 300MCG-200
1 TABLET ORAL
COMMUNITY

## 2020-10-16 NOTE — PROGRESS NOTES
Cancer Center Progress Note    Problem List:      Patient Active Problem List:     Examination of participant in clinical trial     Malignant neoplasm of breast (female), unspecified site     Chest pain of uncertain etiology     Elevated troponin     Posit were negative. PMH/PSH:  Past Medical History:   Diagnosis Date   • Breast CA [facility]) 2001    right breast cancer with lumpectomy, chemo and radiation.    • Essential hypertension    • High blood pressure    • Hyperglycemia    • Visual impairment        Pa throughout in the cervical, supraclavicular, or axillary regions. Psychiatric: The patient's mood is calm and appropriate for this visit.       Labs reviewed at this visit:     Lab Results   Component Value Date    WBC 9.1 09/19/2020    RBC 3.59 (L) 09/19/ involving the abductor longus muscle. Lack of intravenous contrast limits assessment for active extravasation. Continued follow-up is suggested. Assessment/Plan:     1.  Bilateral pulmonary embolism s/p lytics:     She is doing well with INR greater t

## 2020-10-16 NOTE — PROGRESS NOTES
Education Record    Learner:  Patient and Spouse    Disease / Diagnosis: PE - Coumadin    Barriers / Limitations:  None   Comments:    Method:  Discussion   Comments:    General Topics:  Medication   Comments:    Outcome:  Shows understanding   Comments:

## 2020-10-21 ENCOUNTER — ANTI-COAG VISIT (OUTPATIENT)
Dept: HEMATOLOGY/ONCOLOGY | Age: 70
End: 2020-10-21
Attending: INTERNAL MEDICINE
Payer: MEDICARE

## 2020-10-21 DIAGNOSIS — I26.09 OTHER ACUTE PULMONARY EMBOLISM WITH ACUTE COR PULMONALE (HCC): ICD-10-CM

## 2020-10-21 PROCEDURE — 85610 PROTHROMBIN TIME: CPT

## 2020-10-21 PROCEDURE — 36416 COLLJ CAPILLARY BLOOD SPEC: CPT

## 2020-10-28 ENCOUNTER — ANTI-COAG VISIT (OUTPATIENT)
Dept: HEMATOLOGY/ONCOLOGY | Age: 70
End: 2020-10-28
Attending: INTERNAL MEDICINE
Payer: MEDICARE

## 2020-10-28 DIAGNOSIS — I26.09 OTHER ACUTE PULMONARY EMBOLISM WITH ACUTE COR PULMONALE (HCC): ICD-10-CM

## 2020-10-28 PROCEDURE — 85610 PROTHROMBIN TIME: CPT

## 2020-10-28 PROCEDURE — 36416 COLLJ CAPILLARY BLOOD SPEC: CPT

## 2020-11-04 ENCOUNTER — ANTI-COAG VISIT (OUTPATIENT)
Dept: HEMATOLOGY/ONCOLOGY | Age: 70
End: 2020-11-04
Attending: INTERNAL MEDICINE
Payer: MEDICARE

## 2020-11-04 DIAGNOSIS — I26.09 OTHER ACUTE PULMONARY EMBOLISM WITH ACUTE COR PULMONALE (HCC): ICD-10-CM

## 2020-11-04 PROCEDURE — 85610 PROTHROMBIN TIME: CPT

## 2020-11-04 PROCEDURE — 36416 COLLJ CAPILLARY BLOOD SPEC: CPT

## 2020-11-11 ENCOUNTER — ANTI-COAG VISIT (OUTPATIENT)
Dept: HEMATOLOGY/ONCOLOGY | Age: 70
End: 2020-11-11
Attending: INTERNAL MEDICINE
Payer: MEDICARE

## 2020-11-11 DIAGNOSIS — I26.09 OTHER ACUTE PULMONARY EMBOLISM WITH ACUTE COR PULMONALE (HCC): ICD-10-CM

## 2020-11-11 PROCEDURE — 36416 COLLJ CAPILLARY BLOOD SPEC: CPT

## 2020-11-11 PROCEDURE — 85610 PROTHROMBIN TIME: CPT

## 2020-11-25 ENCOUNTER — ANTI-COAG VISIT (OUTPATIENT)
Dept: HEMATOLOGY/ONCOLOGY | Age: 70
End: 2020-11-25
Attending: INTERNAL MEDICINE
Payer: MEDICARE

## 2020-11-25 DIAGNOSIS — E66.01 MORBID OBESITY WITH BMI OF 50.0-59.9, ADULT (HCC): ICD-10-CM

## 2020-11-25 DIAGNOSIS — I26.09 OTHER ACUTE PULMONARY EMBOLISM WITH ACUTE COR PULMONALE (HCC): ICD-10-CM

## 2020-11-25 PROCEDURE — 85610 PROTHROMBIN TIME: CPT

## 2020-11-25 PROCEDURE — 36416 COLLJ CAPILLARY BLOOD SPEC: CPT

## 2020-11-25 RX ORDER — WARFARIN SODIUM 2.5 MG/1
2.5 TABLET ORAL NIGHTLY
Qty: 90 TABLET | Refills: 5 | Status: SHIPPED | OUTPATIENT
Start: 2020-11-25 | End: 2021-02-17

## 2020-12-23 ENCOUNTER — ANTI-COAG VISIT (OUTPATIENT)
Dept: HEMATOLOGY/ONCOLOGY | Age: 70
End: 2020-12-23
Attending: INTERNAL MEDICINE
Payer: MEDICARE

## 2020-12-23 DIAGNOSIS — I26.09 OTHER ACUTE PULMONARY EMBOLISM WITH ACUTE COR PULMONALE (HCC): ICD-10-CM

## 2020-12-23 PROCEDURE — 85610 PROTHROMBIN TIME: CPT

## 2020-12-23 PROCEDURE — 36416 COLLJ CAPILLARY BLOOD SPEC: CPT

## 2021-01-20 ENCOUNTER — ANTI-COAG VISIT (OUTPATIENT)
Dept: HEMATOLOGY/ONCOLOGY | Age: 71
End: 2021-01-20
Attending: INTERNAL MEDICINE
Payer: MEDICARE

## 2021-01-20 DIAGNOSIS — I26.09 OTHER ACUTE PULMONARY EMBOLISM WITH ACUTE COR PULMONALE (HCC): ICD-10-CM

## 2021-01-20 LAB — INR: 1.9 (ref 0.8–1.3)

## 2021-01-20 PROCEDURE — 85610 PROTHROMBIN TIME: CPT

## 2021-01-20 PROCEDURE — 36416 COLLJ CAPILLARY BLOOD SPEC: CPT

## 2021-02-02 DIAGNOSIS — Z23 NEED FOR VACCINATION: ICD-10-CM

## 2021-02-04 ENCOUNTER — IMMUNIZATION (OUTPATIENT)
Dept: LAB | Age: 71
End: 2021-02-04
Attending: HOSPITALIST
Payer: MEDICARE

## 2021-02-04 DIAGNOSIS — Z23 NEED FOR VACCINATION: Primary | ICD-10-CM

## 2021-02-04 PROCEDURE — 0001A SARSCOV2 VAC 30MCG/0.3ML IM: CPT

## 2021-02-17 ENCOUNTER — ANTI-COAG VISIT (OUTPATIENT)
Dept: HEMATOLOGY/ONCOLOGY | Age: 71
End: 2021-02-17
Attending: INTERNAL MEDICINE
Payer: MEDICARE

## 2021-02-17 DIAGNOSIS — I26.09 OTHER ACUTE PULMONARY EMBOLISM WITH ACUTE COR PULMONALE (HCC): ICD-10-CM

## 2021-02-17 DIAGNOSIS — E66.01 MORBID OBESITY WITH BMI OF 50.0-59.9, ADULT (HCC): ICD-10-CM

## 2021-02-17 LAB — INR: 1.8 (ref 0.8–1.3)

## 2021-02-17 PROCEDURE — 36416 COLLJ CAPILLARY BLOOD SPEC: CPT

## 2021-02-17 PROCEDURE — 85610 PROTHROMBIN TIME: CPT

## 2021-02-17 RX ORDER — WARFARIN SODIUM 2.5 MG/1
TABLET ORAL
Qty: 90 TABLET | Refills: 5 | Status: SHIPPED | OUTPATIENT
Start: 2021-02-17 | End: 2021-03-19 | Stop reason: ALTCHOICE

## 2021-02-25 ENCOUNTER — IMMUNIZATION (OUTPATIENT)
Dept: LAB | Age: 71
End: 2021-02-25
Attending: HOSPITALIST
Payer: MEDICARE

## 2021-02-25 DIAGNOSIS — Z23 NEED FOR VACCINATION: Primary | ICD-10-CM

## 2021-02-25 PROCEDURE — 0002A SARSCOV2 VAC 30MCG/0.3ML IM: CPT

## 2021-02-26 ENCOUNTER — ANTI-COAG VISIT (OUTPATIENT)
Dept: HEMATOLOGY/ONCOLOGY | Age: 71
End: 2021-02-26
Attending: INTERNAL MEDICINE
Payer: MEDICARE

## 2021-02-26 DIAGNOSIS — I26.09 OTHER ACUTE PULMONARY EMBOLISM WITH ACUTE COR PULMONALE (HCC): ICD-10-CM

## 2021-02-26 LAB — INR: 2.2 (ref 0.8–1.3)

## 2021-02-26 PROCEDURE — 36416 COLLJ CAPILLARY BLOOD SPEC: CPT

## 2021-02-26 PROCEDURE — 85610 PROTHROMBIN TIME: CPT

## 2021-03-05 ENCOUNTER — ANTI-COAG VISIT (OUTPATIENT)
Dept: HEMATOLOGY/ONCOLOGY | Age: 71
End: 2021-03-05
Attending: INTERNAL MEDICINE
Payer: MEDICARE

## 2021-03-05 DIAGNOSIS — I26.09 OTHER ACUTE PULMONARY EMBOLISM WITH ACUTE COR PULMONALE (HCC): ICD-10-CM

## 2021-03-05 LAB — INR: 2.7 (ref 0.8–1.3)

## 2021-03-05 PROCEDURE — 85610 PROTHROMBIN TIME: CPT

## 2021-03-05 PROCEDURE — 36416 COLLJ CAPILLARY BLOOD SPEC: CPT

## 2021-03-19 ENCOUNTER — OFFICE VISIT (OUTPATIENT)
Dept: HEMATOLOGY/ONCOLOGY | Age: 71
End: 2021-03-19
Attending: INTERNAL MEDICINE
Payer: MEDICARE

## 2021-03-19 ENCOUNTER — ANTI-COAG VISIT (OUTPATIENT)
Dept: HEMATOLOGY/ONCOLOGY | Facility: HOSPITAL | Age: 71
End: 2021-03-19

## 2021-03-19 VITALS
SYSTOLIC BLOOD PRESSURE: 114 MMHG | BODY MASS INDEX: 45.99 KG/M2 | WEIGHT: 293 LBS | HEIGHT: 67.01 IN | OXYGEN SATURATION: 97 % | RESPIRATION RATE: 20 BRPM | TEMPERATURE: 98 F | DIASTOLIC BLOOD PRESSURE: 82 MMHG | HEART RATE: 105 BPM

## 2021-03-19 DIAGNOSIS — D64.9 NORMOCYTIC ANEMIA: ICD-10-CM

## 2021-03-19 DIAGNOSIS — E66.01 MORBID OBESITY WITH BMI OF 50.0-59.9, ADULT (HCC): ICD-10-CM

## 2021-03-19 DIAGNOSIS — R77.8 ELEVATED TOTAL PROTEIN: ICD-10-CM

## 2021-03-19 DIAGNOSIS — I26.09 OTHER ACUTE PULMONARY EMBOLISM WITH ACUTE COR PULMONALE (HCC): Primary | ICD-10-CM

## 2021-03-19 DIAGNOSIS — I26.09 OTHER ACUTE PULMONARY EMBOLISM WITH ACUTE COR PULMONALE (HCC): ICD-10-CM

## 2021-03-19 LAB
ALBUMIN SERPL-MCNC: 3.1 G/DL (ref 3.4–5)
ALBUMIN/GLOB SERPL: 0.6 {RATIO} (ref 1–2)
ALP LIVER SERPL-CCNC: 114 U/L
ALT SERPL-CCNC: 32 U/L
ANION GAP SERPL CALC-SCNC: 7 MMOL/L (ref 0–18)
AST SERPL-CCNC: 29 U/L (ref 15–37)
BASOPHILS # BLD AUTO: 0.05 X10(3) UL (ref 0–0.2)
BASOPHILS NFR BLD AUTO: 0.6 %
BILIRUB SERPL-MCNC: 0.5 MG/DL (ref 0.1–2)
BUN BLD-MCNC: 17 MG/DL (ref 7–18)
BUN/CREAT SERPL: 15.5 (ref 10–20)
CALCIUM BLD-MCNC: 9.5 MG/DL (ref 8.5–10.1)
CHLORIDE SERPL-SCNC: 102 MMOL/L (ref 98–112)
CO2 SERPL-SCNC: 26 MMOL/L (ref 21–32)
CREAT BLD-MCNC: 1.1 MG/DL
DEPRECATED RDW RBC AUTO: 52 FL (ref 35.1–46.3)
EOSINOPHIL # BLD AUTO: 0.16 X10(3) UL (ref 0–0.7)
EOSINOPHIL NFR BLD AUTO: 2 %
ERYTHROCYTE [DISTWIDTH] IN BLOOD BY AUTOMATED COUNT: 14.6 % (ref 11–15)
GLOBULIN PLAS-MCNC: 5.2 G/DL (ref 2.8–4.4)
GLUCOSE BLD-MCNC: 116 MG/DL (ref 70–99)
HCT VFR BLD AUTO: 40.2 %
HGB BLD-MCNC: 13.4 G/DL
IMM GRANULOCYTES # BLD AUTO: 0.03 X10(3) UL (ref 0–1)
IMM GRANULOCYTES NFR BLD: 0.4 %
INR BLD: 2.04 (ref 0.88–1.11)
LDH SERPL L TO P-CCNC: 165 U/L
LYMPHOCYTES # BLD AUTO: 1.91 X10(3) UL (ref 1–4)
LYMPHOCYTES NFR BLD AUTO: 24.3 %
M PROTEIN MFR SERPL ELPH: 8.3 G/DL (ref 6.4–8.2)
MCH RBC QN AUTO: 32 PG (ref 26–34)
MCHC RBC AUTO-ENTMCNC: 33.3 G/DL (ref 31–37)
MCV RBC AUTO: 95.9 FL
MONOCYTES # BLD AUTO: 0.56 X10(3) UL (ref 0.1–1)
MONOCYTES NFR BLD AUTO: 7.1 %
NEUTROPHILS # BLD AUTO: 5.15 X10 (3) UL (ref 1.5–7.7)
NEUTROPHILS # BLD AUTO: 5.15 X10(3) UL (ref 1.5–7.7)
NEUTROPHILS NFR BLD AUTO: 65.6 %
OSMOLALITY SERPL CALC.SUM OF ELEC: 283 MOSM/KG (ref 275–295)
PATIENT FASTING Y/N/NP: NO
PLATELET # BLD AUTO: 270 10(3)UL (ref 150–450)
POTASSIUM SERPL-SCNC: 4 MMOL/L (ref 3.5–5.1)
PSA SERPL DL<=0.01 NG/ML-MCNC: 22.8 SECONDS (ref 12–14.3)
RBC # BLD AUTO: 4.19 X10(6)UL
SODIUM SERPL-SCNC: 135 MMOL/L (ref 136–145)
WBC # BLD AUTO: 7.9 X10(3) UL (ref 4–11)

## 2021-03-19 PROCEDURE — 99214 OFFICE O/P EST MOD 30 MIN: CPT | Performed by: INTERNAL MEDICINE

## 2021-03-19 NOTE — PROGRESS NOTES
Pt here for 6 month MD f/u. Energy level is fair, appetite is good. Pt notes pains in flanks if bending wrong. Pt notes occasionally when she coughs, she has to have a BM.        Outpatient Oncology Care Plan  Problem list:  fatigue  knowledge deficit  Prob

## 2021-03-19 NOTE — PROGRESS NOTES
Cancer Center Progress Note    Problem List:      Patient Active Problem List:     Examination of participant in clinical trial     Malignant neoplasm of breast (female), unspecified site     Chest pain of uncertain etiology     Elevated troponin     Posit 46        In her 52's. Allergies:       Demerol Hcl [Meperi*    UNKNOWN  Grass                   UNKNOWN  Penicillins             UNKNOWN    Medications:  Warfarin Sodium 2.5 MG Oral Tab, Take 1 or 2 tabs nightly.  Adjust dosing based on warfarin clin patient's mood is calm and appropriate for this visit.       Labs reviewed at this visit:     Lab Results   Component Value Date    WBC 7.9 03/19/2021    RBC 4.19 03/19/2021    HGB 13.4 03/19/2021    HCT 40.2 03/19/2021    MCV 95.9 03/19/2021    MCH 32.0 03 for active extravasation. Continued follow-up is suggested. Assessment/Plan:     1. Bilateral pulmonary embolism s/p lytics:     She is doing well with INR greater than 2.0. She now has had six month of therapy.  I discussed the issue of long term main

## 2021-03-23 LAB
ALBUMIN SERPL ELPH-MCNC: 3.52 G/DL (ref 3.75–5.21)
ALBUMIN/GLOB SERPL: 0.86 {RATIO} (ref 1–2)
ALPHA1 GLOB SERPL ELPH-MCNC: 0.27 G/DL (ref 0.19–0.46)
ALPHA2 GLOB SERPL ELPH-MCNC: 1.03 G/DL (ref 0.48–1.05)
B-GLOBULIN SERPL ELPH-MCNC: 1.01 G/DL (ref 0.68–1.23)
GAMMA GLOB SERPL ELPH-MCNC: 1.77 G/DL (ref 0.62–1.7)
KAPPA LC FREE SER-MCNC: 6.61 MG/DL (ref 0.33–1.94)
KAPPA LC FREE/LAMBDA FREE SER NEPH: 1.9 {RATIO} (ref 0.26–1.65)
LAMBDA LC FREE SERPL-MCNC: 3.48 MG/DL (ref 0.57–2.63)
M PROTEIN MFR SERPL ELPH: 7.6 G/DL (ref 6.4–8.2)

## 2021-04-14 ENCOUNTER — ANTI-COAG VISIT (OUTPATIENT)
Dept: HEMATOLOGY/ONCOLOGY | Age: 71
End: 2021-04-14
Attending: INTERNAL MEDICINE
Payer: MEDICARE

## 2021-04-14 DIAGNOSIS — I26.09 OTHER ACUTE PULMONARY EMBOLISM WITH ACUTE COR PULMONALE (HCC): ICD-10-CM

## 2021-04-14 PROCEDURE — 85610 PROTHROMBIN TIME: CPT

## 2021-04-14 PROCEDURE — 36416 COLLJ CAPILLARY BLOOD SPEC: CPT

## 2021-04-20 ENCOUNTER — TELEPHONE (OUTPATIENT)
Dept: HEMATOLOGY/ONCOLOGY | Facility: HOSPITAL | Age: 71
End: 2021-04-20

## 2021-04-20 DIAGNOSIS — I26.09 OTHER ACUTE PULMONARY EMBOLISM WITH ACUTE COR PULMONALE (HCC): ICD-10-CM

## 2021-04-20 DIAGNOSIS — E66.01 MORBID OBESITY WITH BMI OF 50.0-59.9, ADULT (HCC): ICD-10-CM

## 2021-04-20 RX ORDER — WARFARIN SODIUM 2.5 MG/1
TABLET ORAL
Qty: 180 TABLET | Refills: 1 | Status: SHIPPED | OUTPATIENT
Start: 2021-04-20 | End: 2021-12-17

## 2021-04-20 NOTE — TELEPHONE ENCOUNTER
Spoke with patient. She has been taking the Warfarin due to the high cost of Xarelto. Patient is comfortable staying on the warfarin. New script sent to the pharmacy. Verbalized understanding.

## 2021-04-20 NOTE — TELEPHONE ENCOUNTER
Glenard Exon calling regarding her warfarin 2.5mg  she will be out this Friday and needs refill [facility] pharmacy will not refill for another week.  Thank you Ponce Bae

## 2021-05-12 ENCOUNTER — ANTI-COAG VISIT (OUTPATIENT)
Dept: HEMATOLOGY/ONCOLOGY | Age: 71
End: 2021-05-12
Attending: INTERNAL MEDICINE
Payer: MEDICARE

## 2021-05-12 DIAGNOSIS — I26.09 OTHER ACUTE PULMONARY EMBOLISM WITH ACUTE COR PULMONALE (HCC): ICD-10-CM

## 2021-05-12 PROCEDURE — 85610 PROTHROMBIN TIME: CPT

## 2021-05-12 PROCEDURE — 36416 COLLJ CAPILLARY BLOOD SPEC: CPT

## 2021-06-09 ENCOUNTER — ANTI-COAG VISIT (OUTPATIENT)
Dept: HEMATOLOGY/ONCOLOGY | Age: 71
End: 2021-06-09
Attending: INTERNAL MEDICINE
Payer: MEDICARE

## 2021-06-09 DIAGNOSIS — I26.09 OTHER ACUTE PULMONARY EMBOLISM WITH ACUTE COR PULMONALE (HCC): ICD-10-CM

## 2021-06-09 PROCEDURE — 36416 COLLJ CAPILLARY BLOOD SPEC: CPT

## 2021-06-09 PROCEDURE — 85610 PROTHROMBIN TIME: CPT

## 2021-07-07 ENCOUNTER — ANTI-COAG VISIT (OUTPATIENT)
Dept: HEMATOLOGY/ONCOLOGY | Age: 71
End: 2021-07-07
Attending: INTERNAL MEDICINE
Payer: MEDICARE

## 2021-07-07 DIAGNOSIS — I26.09 OTHER ACUTE PULMONARY EMBOLISM WITH ACUTE COR PULMONALE (HCC): ICD-10-CM

## 2021-07-07 LAB — INR: 3.2 (ref 0.8–1.3)

## 2021-07-07 PROCEDURE — 85610 PROTHROMBIN TIME: CPT

## 2021-07-07 PROCEDURE — 36416 COLLJ CAPILLARY BLOOD SPEC: CPT

## 2021-07-14 ENCOUNTER — ANTI-COAG VISIT (OUTPATIENT)
Dept: HEMATOLOGY/ONCOLOGY | Age: 71
End: 2021-07-14
Attending: INTERNAL MEDICINE
Payer: MEDICARE

## 2021-07-14 DIAGNOSIS — I26.09 OTHER ACUTE PULMONARY EMBOLISM WITH ACUTE COR PULMONALE (HCC): ICD-10-CM

## 2021-07-14 LAB — INR: 2.8 (ref 0.8–1.3)

## 2021-07-14 PROCEDURE — 36416 COLLJ CAPILLARY BLOOD SPEC: CPT

## 2021-07-14 PROCEDURE — 85610 PROTHROMBIN TIME: CPT

## 2021-07-21 ENCOUNTER — ANTI-COAG VISIT (OUTPATIENT)
Dept: HEMATOLOGY/ONCOLOGY | Age: 71
End: 2021-07-21
Attending: INTERNAL MEDICINE
Payer: MEDICARE

## 2021-07-21 DIAGNOSIS — I26.09 OTHER ACUTE PULMONARY EMBOLISM WITH ACUTE COR PULMONALE (HCC): ICD-10-CM

## 2021-07-21 LAB — INR: 2.9 (ref 0.8–1.3)

## 2021-07-21 PROCEDURE — 36416 COLLJ CAPILLARY BLOOD SPEC: CPT

## 2021-07-21 PROCEDURE — 85610 PROTHROMBIN TIME: CPT

## 2021-08-11 ENCOUNTER — ANTI-COAG VISIT (OUTPATIENT)
Dept: HEMATOLOGY/ONCOLOGY | Age: 71
End: 2021-08-11
Attending: INTERNAL MEDICINE
Payer: MEDICARE

## 2021-08-11 DIAGNOSIS — I26.09 OTHER ACUTE PULMONARY EMBOLISM WITH ACUTE COR PULMONALE (HCC): ICD-10-CM

## 2021-08-11 LAB — INR: 2.7 (ref 0.8–1.3)

## 2021-08-11 PROCEDURE — 85610 PROTHROMBIN TIME: CPT

## 2021-08-11 PROCEDURE — 36416 COLLJ CAPILLARY BLOOD SPEC: CPT

## 2021-09-17 ENCOUNTER — APPOINTMENT (OUTPATIENT)
Dept: HEMATOLOGY/ONCOLOGY | Age: 71
End: 2021-09-17
Attending: INTERNAL MEDICINE
Payer: MEDICARE

## 2021-09-24 ENCOUNTER — OFFICE VISIT (OUTPATIENT)
Dept: HEMATOLOGY/ONCOLOGY | Age: 71
End: 2021-09-24
Attending: INTERNAL MEDICINE
Payer: MEDICARE

## 2021-09-24 ENCOUNTER — ANTI-COAG VISIT (OUTPATIENT)
Dept: HEMATOLOGY/ONCOLOGY | Facility: HOSPITAL | Age: 71
End: 2021-09-24

## 2021-09-24 VITALS
DIASTOLIC BLOOD PRESSURE: 83 MMHG | RESPIRATION RATE: 20 BRPM | OXYGEN SATURATION: 96 % | TEMPERATURE: 98 F | BODY MASS INDEX: 45.99 KG/M2 | HEART RATE: 114 BPM | SYSTOLIC BLOOD PRESSURE: 119 MMHG | HEIGHT: 67.01 IN | WEIGHT: 293 LBS

## 2021-09-24 DIAGNOSIS — Z85.3 HISTORY OF BREAST CANCER IN FEMALE: ICD-10-CM

## 2021-09-24 DIAGNOSIS — I26.09 OTHER ACUTE PULMONARY EMBOLISM WITH ACUTE COR PULMONALE (HCC): Primary | ICD-10-CM

## 2021-09-24 DIAGNOSIS — I26.09 OTHER ACUTE PULMONARY EMBOLISM WITH ACUTE COR PULMONALE (HCC): ICD-10-CM

## 2021-09-24 LAB
INR CARTRIDGE LOT #: ABNORMAL
INR: 2.6 (ref 0.8–1.3)

## 2021-09-24 PROCEDURE — 99213 OFFICE O/P EST LOW 20 MIN: CPT | Performed by: INTERNAL MEDICINE

## 2021-09-24 NOTE — PROGRESS NOTES
Pt here for 6 month MD f/u. Pt continues on Coumadin. Energy level has been pretty good most days. Appetite has been good, doesn't eat as much as she used to. Denies pain. Pt has no further complaints.

## 2021-09-24 NOTE — PROGRESS NOTES
Cancer Center Progress Note    Problem List:      Patient Active Problem List:     Examination of participant in clinical trial     Malignant neoplasm of breast (female), unspecified site     Chest pain of uncertain etiology     Elevated troponin     Posit Cancer Maternal Grandmother 48        In her 52's. Allergies:       Demerol Hcl [Meperi*    UNKNOWN  Grass                   UNKNOWN  Penicillins             UNKNOWN    Medications:  Warfarin Sodium 2.5 MG Oral Tab, Take 1 or 2 tabs nightly.  Adjust d tenderness. Psychiatric: The patient's mood is calm and appropriate for this visit.       Labs reviewed at this visit:     Lab Results   Component Value Date    WBC 7.9 03/19/2021    RBC 4.19 03/19/2021    HGB 13.4 03/19/2021    HCT 40.2 03/19/2021    MCV the abductor longus muscle. Lack of intravenous contrast limits assessment for active extravasation. Continued follow-up is suggested. Assessment/Plan:     1. Bilateral pulmonary embolism s/p lytics:     She is doing well with INR greater than 2.0.  Benito Trinidad

## 2021-10-22 ENCOUNTER — ANTI-COAG VISIT (OUTPATIENT)
Dept: HEMATOLOGY/ONCOLOGY | Age: 71
End: 2021-10-22
Attending: INTERNAL MEDICINE
Payer: MEDICARE

## 2021-10-22 DIAGNOSIS — I26.09 OTHER ACUTE PULMONARY EMBOLISM WITH ACUTE COR PULMONALE (HCC): ICD-10-CM

## 2021-10-22 PROCEDURE — 85610 PROTHROMBIN TIME: CPT

## 2021-10-22 PROCEDURE — 36416 COLLJ CAPILLARY BLOOD SPEC: CPT

## 2021-11-19 ENCOUNTER — ANTI-COAG VISIT (OUTPATIENT)
Dept: HEMATOLOGY/ONCOLOGY | Age: 71
End: 2021-11-19
Attending: INTERNAL MEDICINE
Payer: MEDICARE

## 2021-11-19 DIAGNOSIS — I26.09 OTHER ACUTE PULMONARY EMBOLISM WITH ACUTE COR PULMONALE (HCC): ICD-10-CM

## 2021-11-19 PROCEDURE — 36416 COLLJ CAPILLARY BLOOD SPEC: CPT

## 2021-11-19 PROCEDURE — 85610 PROTHROMBIN TIME: CPT

## 2021-12-17 ENCOUNTER — ANTI-COAG VISIT (OUTPATIENT)
Dept: HEMATOLOGY/ONCOLOGY | Age: 71
End: 2021-12-17
Attending: INTERNAL MEDICINE
Payer: MEDICARE

## 2021-12-17 DIAGNOSIS — I26.09 OTHER ACUTE PULMONARY EMBOLISM WITH ACUTE COR PULMONALE (HCC): ICD-10-CM

## 2021-12-17 PROCEDURE — 36416 COLLJ CAPILLARY BLOOD SPEC: CPT

## 2021-12-17 PROCEDURE — 85610 PROTHROMBIN TIME: CPT

## 2021-12-17 RX ORDER — WARFARIN SODIUM 2.5 MG/1
TABLET ORAL
Qty: 180 TABLET | Refills: 1 | Status: SHIPPED | OUTPATIENT
Start: 2021-12-17

## 2022-01-14 ENCOUNTER — ANTI-COAG VISIT (OUTPATIENT)
Dept: HEMATOLOGY/ONCOLOGY | Age: 72
End: 2022-01-14
Attending: INTERNAL MEDICINE
Payer: MEDICARE

## 2022-01-14 DIAGNOSIS — I26.09 OTHER ACUTE PULMONARY EMBOLISM WITH ACUTE COR PULMONALE (HCC): ICD-10-CM

## 2022-01-14 LAB — INR: 2.5 (ref 0.8–1.3)

## 2022-01-14 PROCEDURE — 36416 COLLJ CAPILLARY BLOOD SPEC: CPT

## 2022-01-14 PROCEDURE — 85610 PROTHROMBIN TIME: CPT

## 2022-02-11 ENCOUNTER — ANTI-COAG VISIT (OUTPATIENT)
Dept: HEMATOLOGY/ONCOLOGY | Age: 72
End: 2022-02-11
Attending: INTERNAL MEDICINE
Payer: MEDICARE

## 2022-02-11 DIAGNOSIS — I26.09 OTHER ACUTE PULMONARY EMBOLISM WITH ACUTE COR PULMONALE (HCC): ICD-10-CM

## 2022-02-11 LAB — INR: 2 (ref 0.8–1.3)

## 2022-02-11 PROCEDURE — 85610 PROTHROMBIN TIME: CPT

## 2022-02-11 PROCEDURE — 36416 COLLJ CAPILLARY BLOOD SPEC: CPT

## 2022-02-11 NOTE — PROGRESS NOTES
Patient here for her INR this morning had a question concerning being off the coumadin for her dental implant procedure. She had a recent fall and was not injured but has a hematoma on her shoulder. She is concerned about any increased risk for clotting while off of the coumadin due to this. Dr. Kathy Waggoner consulted and does not think that she is at any higher risk. Patient was called and informed. She was instructed to follow the instructions provided to hold coumadin and resume after the dental work. She has an appointment to recheck the INR.

## 2022-02-16 ENCOUNTER — TELEPHONE (OUTPATIENT)
Dept: HEMATOLOGY/ONCOLOGY | Facility: HOSPITAL | Age: 72
End: 2022-02-16

## 2022-02-16 NOTE — TELEPHONE ENCOUNTER
Patient called to report that she had her dental implant procedure today. She is taking her dose of coumadin tonight but has also been prescribed two antibiotics-clindamycin and azithromycin. She was told by pharmacy to call here. She will take both antibiotics and the coumadin but will check her INR on Friday 2/18 rather then waiting for 2/23. Appointment made.

## 2022-02-18 ENCOUNTER — ANTI-COAG VISIT (OUTPATIENT)
Dept: HEMATOLOGY/ONCOLOGY | Age: 72
End: 2022-02-18
Attending: INTERNAL MEDICINE
Payer: MEDICARE

## 2022-02-18 DIAGNOSIS — I26.09 OTHER ACUTE PULMONARY EMBOLISM WITH ACUTE COR PULMONALE (HCC): ICD-10-CM

## 2022-02-18 LAB — INR: 1.3 (ref 0.8–1.3)

## 2022-02-18 PROCEDURE — 36416 COLLJ CAPILLARY BLOOD SPEC: CPT

## 2022-02-18 PROCEDURE — 85610 PROTHROMBIN TIME: CPT

## 2022-02-23 ENCOUNTER — ANTI-COAG VISIT (OUTPATIENT)
Dept: HEMATOLOGY/ONCOLOGY | Age: 72
End: 2022-02-23
Attending: INTERNAL MEDICINE
Payer: MEDICARE

## 2022-02-23 DIAGNOSIS — I26.09 OTHER ACUTE PULMONARY EMBOLISM WITH ACUTE COR PULMONALE (HCC): ICD-10-CM

## 2022-02-23 LAB — INR: 1.8 (ref 0.8–1.3)

## 2022-02-23 PROCEDURE — 85610 PROTHROMBIN TIME: CPT

## 2022-02-23 PROCEDURE — 36416 COLLJ CAPILLARY BLOOD SPEC: CPT

## 2022-03-02 ENCOUNTER — ANTI-COAG VISIT (OUTPATIENT)
Dept: HEMATOLOGY/ONCOLOGY | Age: 72
End: 2022-03-02
Attending: INTERNAL MEDICINE
Payer: MEDICARE

## 2022-03-02 DIAGNOSIS — I26.09 OTHER ACUTE PULMONARY EMBOLISM WITH ACUTE COR PULMONALE (HCC): ICD-10-CM

## 2022-03-02 LAB — INR: 2 (ref 0.8–1.3)

## 2022-03-02 PROCEDURE — 85610 PROTHROMBIN TIME: CPT

## 2022-03-02 PROCEDURE — 36416 COLLJ CAPILLARY BLOOD SPEC: CPT

## 2022-03-09 ENCOUNTER — ANTI-COAG VISIT (OUTPATIENT)
Dept: HEMATOLOGY/ONCOLOGY | Age: 72
End: 2022-03-09
Attending: INTERNAL MEDICINE
Payer: MEDICARE

## 2022-03-09 DIAGNOSIS — I26.09 OTHER ACUTE PULMONARY EMBOLISM WITH ACUTE COR PULMONALE (HCC): ICD-10-CM

## 2022-03-09 LAB — INR: 2.6 (ref 0.8–1.3)

## 2022-03-09 PROCEDURE — 36416 COLLJ CAPILLARY BLOOD SPEC: CPT

## 2022-03-09 PROCEDURE — 85610 PROTHROMBIN TIME: CPT

## 2022-03-25 ENCOUNTER — ANTI-COAG VISIT (OUTPATIENT)
Dept: HEMATOLOGY/ONCOLOGY | Facility: HOSPITAL | Age: 72
End: 2022-03-25

## 2022-03-25 ENCOUNTER — OFFICE VISIT (OUTPATIENT)
Dept: HEMATOLOGY/ONCOLOGY | Age: 72
End: 2022-03-25
Attending: INTERNAL MEDICINE
Payer: MEDICARE

## 2022-03-25 VITALS
DIASTOLIC BLOOD PRESSURE: 75 MMHG | TEMPERATURE: 98 F | SYSTOLIC BLOOD PRESSURE: 184 MMHG | WEIGHT: 293 LBS | HEART RATE: 87 BPM | HEIGHT: 67 IN | OXYGEN SATURATION: 96 % | BODY MASS INDEX: 45.99 KG/M2

## 2022-03-25 DIAGNOSIS — Z85.3 HISTORY OF BREAST CANCER IN FEMALE: ICD-10-CM

## 2022-03-25 DIAGNOSIS — I26.09 OTHER ACUTE PULMONARY EMBOLISM WITH ACUTE COR PULMONALE (HCC): ICD-10-CM

## 2022-03-25 LAB
ALBUMIN SERPL-MCNC: 3.1 G/DL (ref 3.4–5)
ALBUMIN/GLOB SERPL: 0.7 {RATIO} (ref 1–2)
ALP LIVER SERPL-CCNC: 106 U/L
ALT SERPL-CCNC: 33 U/L
ANION GAP SERPL CALC-SCNC: 5 MMOL/L (ref 0–18)
AST SERPL-CCNC: 31 U/L (ref 15–37)
BASOPHILS NFR BLD AUTO: 0.8 %
BILIRUB SERPL-MCNC: 0.4 MG/DL (ref 0.1–2)
BUN BLD-MCNC: 21 MG/DL (ref 7–18)
CALCIUM BLD-MCNC: 9.2 MG/DL (ref 8.5–10.1)
CHLORIDE SERPL-SCNC: 107 MMOL/L (ref 98–112)
CO2 SERPL-SCNC: 25 MMOL/L (ref 21–32)
CREAT BLD-MCNC: 1.07 MG/DL
EOSINOPHIL # BLD AUTO: 0.14 X10(3) UL (ref 0–0.7)
EOSINOPHIL NFR BLD AUTO: 2.1 %
ERYTHROCYTE [DISTWIDTH] IN BLOOD BY AUTOMATED COUNT: 14.5 %
FASTING STATUS PATIENT QL REPORTED: NO
GLUCOSE BLD-MCNC: 113 MG/DL (ref 70–99)
HCT VFR BLD AUTO: 39.9 %
HGB BLD-MCNC: 13.2 G/DL
IMM GRANULOCYTES # BLD AUTO: 0.02 X10(3) UL (ref 0–1)
IMM GRANULOCYTES NFR BLD: 0.3 %
INR: 3.2 (ref 0.8–1.3)
LYMPHOCYTES # BLD AUTO: 1.54 X10(3) UL (ref 1–4)
LYMPHOCYTES NFR BLD AUTO: 23.3 %
MCH RBC QN AUTO: 32 PG (ref 26–34)
MCHC RBC AUTO-ENTMCNC: 33.1 G/DL (ref 31–37)
MCV RBC AUTO: 96.6 FL
MONOCYTES # BLD AUTO: 0.47 X10(3) UL (ref 0.1–1)
MONOCYTES NFR BLD AUTO: 7.1 %
NEUTROPHILS # BLD AUTO: 4.38 X10 (3) UL (ref 1.5–7.7)
NEUTROPHILS # BLD AUTO: 4.38 X10(3) UL (ref 1.5–7.7)
NEUTROPHILS NFR BLD AUTO: 66.4 %
OSMOLALITY SERPL CALC.SUM OF ELEC: 288 MOSM/KG (ref 275–295)
PLATELET # BLD AUTO: 237 10(3)UL (ref 150–450)
POTASSIUM SERPL-SCNC: 3.8 MMOL/L (ref 3.5–5.1)
PROT SERPL-MCNC: 7.7 G/DL (ref 6.4–8.2)
RBC # BLD AUTO: 4.13 X10(6)UL
SODIUM SERPL-SCNC: 137 MMOL/L (ref 136–145)
WBC # BLD AUTO: 6.6 X10(3) UL (ref 4–11)

## 2022-03-25 PROCEDURE — 99213 OFFICE O/P EST LOW 20 MIN: CPT | Performed by: INTERNAL MEDICINE

## 2022-03-25 NOTE — PROGRESS NOTES
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 She had some dental implants and was on antibiotics for a while but has completed this. She denies any swelling or pain in her legs. She denies cough or shortness of breath. She has been travelling and had to stop to catch her breath a few times. She has been consistent with her warfarin.       Education Record    Learner:  Patient    Disease / Diagnosis: VTE    Barriers / Limitations:  None   Comments:    Method:  Discussion   Comments:    General Topics:  Side effects and symptom management   Comments:    Outcome:  Shows understanding   Comments:

## 2022-04-01 ENCOUNTER — ANTI-COAG VISIT (OUTPATIENT)
Dept: HEMATOLOGY/ONCOLOGY | Age: 72
End: 2022-04-01
Attending: INTERNAL MEDICINE
Payer: MEDICARE

## 2022-04-01 DIAGNOSIS — I26.09 OTHER ACUTE PULMONARY EMBOLISM WITH ACUTE COR PULMONALE (HCC): ICD-10-CM

## 2022-04-01 LAB — INR: 3.1 (ref 0.8–1.3)

## 2022-04-11 ENCOUNTER — ANTI-COAG VISIT (OUTPATIENT)
Dept: HEMATOLOGY/ONCOLOGY | Age: 72
End: 2022-04-11
Attending: INTERNAL MEDICINE
Payer: MEDICARE

## 2022-04-11 DIAGNOSIS — I26.09 OTHER ACUTE PULMONARY EMBOLISM WITH ACUTE COR PULMONALE (HCC): ICD-10-CM

## 2022-04-11 LAB — INR: 2.5 (ref 0.8–1.3)

## 2022-04-11 PROCEDURE — 36416 COLLJ CAPILLARY BLOOD SPEC: CPT

## 2022-04-11 PROCEDURE — 85610 PROTHROMBIN TIME: CPT

## 2022-05-11 ENCOUNTER — ANTI-COAG VISIT (OUTPATIENT)
Dept: HEMATOLOGY/ONCOLOGY | Age: 72
End: 2022-05-11
Attending: INTERNAL MEDICINE
Payer: MEDICARE

## 2022-05-11 DIAGNOSIS — I26.09 OTHER ACUTE PULMONARY EMBOLISM WITH ACUTE COR PULMONALE (HCC): ICD-10-CM

## 2022-05-11 LAB — INR: 2.5 (ref 0.8–1.3)

## 2022-05-11 PROCEDURE — 36416 COLLJ CAPILLARY BLOOD SPEC: CPT

## 2022-05-11 PROCEDURE — 85610 PROTHROMBIN TIME: CPT

## 2022-05-25 ENCOUNTER — ANTI-COAG VISIT (OUTPATIENT)
Dept: HEMATOLOGY/ONCOLOGY | Age: 72
End: 2022-05-25
Attending: INTERNAL MEDICINE
Payer: MEDICARE

## 2022-05-25 DIAGNOSIS — I26.09 OTHER ACUTE PULMONARY EMBOLISM WITH ACUTE COR PULMONALE (HCC): ICD-10-CM

## 2022-05-25 LAB — INR: 1.8 (ref 0.8–1.3)

## 2022-05-25 PROCEDURE — 85610 PROTHROMBIN TIME: CPT

## 2022-05-25 PROCEDURE — 36416 COLLJ CAPILLARY BLOOD SPEC: CPT

## 2022-06-01 ENCOUNTER — ANTI-COAG VISIT (OUTPATIENT)
Dept: HEMATOLOGY/ONCOLOGY | Age: 72
End: 2022-06-01
Attending: INTERNAL MEDICINE
Payer: MEDICARE

## 2022-06-01 DIAGNOSIS — I26.09 OTHER ACUTE PULMONARY EMBOLISM WITH ACUTE COR PULMONALE (HCC): ICD-10-CM

## 2022-06-01 LAB — INR: 2.7 (ref 0.8–1.3)

## 2022-06-01 PROCEDURE — 36416 COLLJ CAPILLARY BLOOD SPEC: CPT

## 2022-06-01 PROCEDURE — 85610 PROTHROMBIN TIME: CPT

## 2022-06-08 ENCOUNTER — ANTI-COAG VISIT (OUTPATIENT)
Dept: HEMATOLOGY/ONCOLOGY | Age: 72
End: 2022-06-08
Attending: INTERNAL MEDICINE
Payer: MEDICARE

## 2022-06-08 DIAGNOSIS — I26.09 OTHER ACUTE PULMONARY EMBOLISM WITH ACUTE COR PULMONALE (HCC): ICD-10-CM

## 2022-06-08 LAB — INR: 2.5 (ref 0.8–1.3)

## 2022-06-08 PROCEDURE — 85610 PROTHROMBIN TIME: CPT

## 2022-06-08 PROCEDURE — 36416 COLLJ CAPILLARY BLOOD SPEC: CPT

## 2022-06-22 ENCOUNTER — ANTI-COAG VISIT (OUTPATIENT)
Dept: HEMATOLOGY/ONCOLOGY | Age: 72
End: 2022-06-22
Attending: INTERNAL MEDICINE
Payer: MEDICARE

## 2022-06-22 DIAGNOSIS — I26.09 OTHER ACUTE PULMONARY EMBOLISM WITH ACUTE COR PULMONALE (HCC): ICD-10-CM

## 2022-06-22 LAB — INR: 2.8 (ref 0.8–1.3)

## 2022-06-22 PROCEDURE — 85610 PROTHROMBIN TIME: CPT

## 2022-06-22 PROCEDURE — 36416 COLLJ CAPILLARY BLOOD SPEC: CPT

## 2022-07-06 ENCOUNTER — ANTI-COAG VISIT (OUTPATIENT)
Dept: HEMATOLOGY/ONCOLOGY | Age: 72
End: 2022-07-06
Attending: INTERNAL MEDICINE
Payer: MEDICARE

## 2022-07-06 DIAGNOSIS — I26.09 OTHER ACUTE PULMONARY EMBOLISM WITH ACUTE COR PULMONALE (HCC): ICD-10-CM

## 2022-07-06 LAB — INR: 2.9 (ref 0.8–1.3)

## 2022-07-06 PROCEDURE — 36416 COLLJ CAPILLARY BLOOD SPEC: CPT

## 2022-07-06 PROCEDURE — 85610 PROTHROMBIN TIME: CPT

## 2022-08-03 ENCOUNTER — ANTI-COAG VISIT (OUTPATIENT)
Dept: HEMATOLOGY/ONCOLOGY | Age: 72
End: 2022-08-03
Attending: INTERNAL MEDICINE
Payer: MEDICARE

## 2022-08-03 DIAGNOSIS — I26.09 OTHER ACUTE PULMONARY EMBOLISM WITH ACUTE COR PULMONALE (HCC): ICD-10-CM

## 2022-08-03 LAB — INR: 3.1 (ref 0.8–1.3)

## 2022-08-03 PROCEDURE — 85610 PROTHROMBIN TIME: CPT

## 2022-08-03 PROCEDURE — 36416 COLLJ CAPILLARY BLOOD SPEC: CPT

## 2022-08-10 ENCOUNTER — ANTI-COAG VISIT (OUTPATIENT)
Dept: HEMATOLOGY/ONCOLOGY | Age: 72
End: 2022-08-10
Attending: INTERNAL MEDICINE
Payer: MEDICARE

## 2022-08-10 DIAGNOSIS — I26.09 OTHER ACUTE PULMONARY EMBOLISM WITH ACUTE COR PULMONALE (HCC): ICD-10-CM

## 2022-08-10 LAB — INR: 3.4 (ref 0.8–1.3)

## 2022-08-10 PROCEDURE — 36416 COLLJ CAPILLARY BLOOD SPEC: CPT

## 2022-08-10 PROCEDURE — 85610 PROTHROMBIN TIME: CPT

## 2022-08-17 ENCOUNTER — ANTI-COAG VISIT (OUTPATIENT)
Dept: HEMATOLOGY/ONCOLOGY | Age: 72
End: 2022-08-17
Attending: INTERNAL MEDICINE
Payer: MEDICARE

## 2022-08-17 DIAGNOSIS — I26.09 OTHER ACUTE PULMONARY EMBOLISM WITH ACUTE COR PULMONALE (HCC): ICD-10-CM

## 2022-08-17 LAB — INR: 2 (ref 0.8–1.3)

## 2022-08-17 PROCEDURE — 85610 PROTHROMBIN TIME: CPT

## 2022-08-17 PROCEDURE — 36416 COLLJ CAPILLARY BLOOD SPEC: CPT

## 2022-08-24 ENCOUNTER — ANTI-COAG VISIT (OUTPATIENT)
Dept: HEMATOLOGY/ONCOLOGY | Age: 72
End: 2022-08-24
Attending: INTERNAL MEDICINE
Payer: MEDICARE

## 2022-08-24 DIAGNOSIS — I26.09 OTHER ACUTE PULMONARY EMBOLISM WITH ACUTE COR PULMONALE (HCC): ICD-10-CM

## 2022-08-24 LAB — INR: 2.4 (ref 0.8–1.3)

## 2022-08-24 PROCEDURE — 85610 PROTHROMBIN TIME: CPT

## 2022-08-24 PROCEDURE — 36416 COLLJ CAPILLARY BLOOD SPEC: CPT

## 2022-08-31 ENCOUNTER — APPOINTMENT (OUTPATIENT)
Dept: HEMATOLOGY/ONCOLOGY | Age: 72
End: 2022-08-31
Attending: INTERNAL MEDICINE
Payer: MEDICARE

## 2022-09-07 ENCOUNTER — ANTI-COAG VISIT (OUTPATIENT)
Dept: HEMATOLOGY/ONCOLOGY | Age: 72
End: 2022-09-07
Attending: INTERNAL MEDICINE
Payer: MEDICARE

## 2022-09-07 DIAGNOSIS — I26.09 OTHER ACUTE PULMONARY EMBOLISM WITH ACUTE COR PULMONALE (HCC): ICD-10-CM

## 2022-09-07 LAB — INR: 2.3 (ref 0.8–1.3)

## 2022-09-07 PROCEDURE — 36416 COLLJ CAPILLARY BLOOD SPEC: CPT

## 2022-09-07 PROCEDURE — 85610 PROTHROMBIN TIME: CPT

## 2022-09-28 ENCOUNTER — HOSPITAL ENCOUNTER (OUTPATIENT)
Dept: MAMMOGRAPHY | Age: 72
Discharge: HOME | End: 2022-09-28
Attending: FAMILY MEDICINE
Payer: MEDICARE

## 2022-09-28 DIAGNOSIS — Z12.31 ENCOUNTER FOR SCREENING MAMMOGRAM FOR MALIGNANT NEOPLASM OF BREAST: ICD-10-CM

## 2022-09-28 PROCEDURE — 77063 BREAST TOMOSYNTHESIS BI: CPT | Performed by: FAMILY MEDICINE

## 2022-09-28 PROCEDURE — 77067 SCR MAMMO BI INCL CAD: CPT | Performed by: FAMILY MEDICINE

## 2022-09-30 ENCOUNTER — ANTI-COAG VISIT (OUTPATIENT)
Dept: HEMATOLOGY/ONCOLOGY | Facility: HOSPITAL | Age: 72
End: 2022-09-30

## 2022-09-30 ENCOUNTER — OFFICE VISIT (OUTPATIENT)
Dept: HEMATOLOGY/ONCOLOGY | Age: 72
End: 2022-09-30
Attending: INTERNAL MEDICINE

## 2022-09-30 VITALS
HEART RATE: 92 BPM | TEMPERATURE: 97 F | BODY MASS INDEX: 45.99 KG/M2 | OXYGEN SATURATION: 97 % | DIASTOLIC BLOOD PRESSURE: 74 MMHG | HEIGHT: 67.01 IN | SYSTOLIC BLOOD PRESSURE: 109 MMHG | WEIGHT: 293 LBS

## 2022-09-30 DIAGNOSIS — I26.09 OTHER ACUTE PULMONARY EMBOLISM WITH ACUTE COR PULMONALE (HCC): ICD-10-CM

## 2022-09-30 DIAGNOSIS — Z85.3 HISTORY OF BREAST CANCER IN FEMALE: ICD-10-CM

## 2022-09-30 DIAGNOSIS — I26.09 OTHER ACUTE PULMONARY EMBOLISM WITH ACUTE COR PULMONALE (HCC): Primary | ICD-10-CM

## 2022-09-30 LAB — INR: 2.1 (ref 0.8–1.3)

## 2022-09-30 PROCEDURE — 99213 OFFICE O/P EST LOW 20 MIN: CPT | Performed by: INTERNAL MEDICINE

## 2022-09-30 RX ORDER — WARFARIN SODIUM 2.5 MG/1
TABLET ORAL
Qty: 180 TABLET | Refills: 1 | Status: SHIPPED | OUTPATIENT
Start: 2022-09-30

## 2022-09-30 NOTE — PROGRESS NOTES
Outpatient Oncology Care Plan  Problem list:  knowledge deficit    Problems related to:    disease/disease progression    Interventions:  provided general teaching    Expected outcomes:  understands plan of care    Progress towards outcome:  making progress    Education Record    Learner:  Patient  Barriers / Limitations:  None  Method:  Brief focused  Outcome:  Shows understanding  Comments:      Pt here for follow up. Takes coumadin daily. INR within range. Mammogram done this week. No new complaints.

## 2022-10-26 ENCOUNTER — ANTI-COAG VISIT (OUTPATIENT)
Dept: HEMATOLOGY/ONCOLOGY | Age: 72
End: 2022-10-26
Attending: INTERNAL MEDICINE
Payer: MEDICARE

## 2022-10-26 DIAGNOSIS — I26.09 OTHER ACUTE PULMONARY EMBOLISM WITH ACUTE COR PULMONALE (HCC): ICD-10-CM

## 2022-10-26 LAB — INR: 2.3 (ref 0.8–1.3)

## 2022-10-26 PROCEDURE — 36416 COLLJ CAPILLARY BLOOD SPEC: CPT

## 2022-10-26 PROCEDURE — 85610 PROTHROMBIN TIME: CPT

## 2022-11-23 ENCOUNTER — ANTI-COAG VISIT (OUTPATIENT)
Dept: HEMATOLOGY/ONCOLOGY | Age: 72
End: 2022-11-23
Attending: INTERNAL MEDICINE
Payer: MEDICARE

## 2022-11-23 DIAGNOSIS — I26.09 OTHER ACUTE PULMONARY EMBOLISM WITH ACUTE COR PULMONALE (HCC): ICD-10-CM

## 2022-11-23 LAB — INR: 2 (ref 0.8–1.3)

## 2022-11-23 PROCEDURE — 36416 COLLJ CAPILLARY BLOOD SPEC: CPT

## 2022-11-23 PROCEDURE — 85610 PROTHROMBIN TIME: CPT

## 2022-12-21 ENCOUNTER — ANTI-COAG VISIT (OUTPATIENT)
Dept: HEMATOLOGY/ONCOLOGY | Age: 72
End: 2022-12-21
Attending: INTERNAL MEDICINE
Payer: MEDICARE

## 2022-12-21 DIAGNOSIS — I26.09 OTHER ACUTE PULMONARY EMBOLISM WITH ACUTE COR PULMONALE (HCC): ICD-10-CM

## 2022-12-21 LAB — INR: 2.2 (ref 0.8–1.3)

## 2022-12-21 PROCEDURE — 85610 PROTHROMBIN TIME: CPT

## 2022-12-21 PROCEDURE — 36416 COLLJ CAPILLARY BLOOD SPEC: CPT

## 2023-01-18 ENCOUNTER — ANTI-COAG VISIT (OUTPATIENT)
Dept: HEMATOLOGY/ONCOLOGY | Age: 73
End: 2023-01-18
Attending: INTERNAL MEDICINE
Payer: MEDICARE

## 2023-01-18 DIAGNOSIS — I26.09 OTHER ACUTE PULMONARY EMBOLISM WITH ACUTE COR PULMONALE (HCC): ICD-10-CM

## 2023-01-18 LAB — INR: 2.3 (ref 0.8–1.3)

## 2023-01-18 PROCEDURE — 36416 COLLJ CAPILLARY BLOOD SPEC: CPT

## 2023-01-18 PROCEDURE — 85610 PROTHROMBIN TIME: CPT

## 2023-02-17 ENCOUNTER — ANTI-COAG VISIT (OUTPATIENT)
Dept: HEMATOLOGY/ONCOLOGY | Facility: HOSPITAL | Age: 73
End: 2023-02-17

## 2023-02-17 ENCOUNTER — OFFICE VISIT (OUTPATIENT)
Dept: HEMATOLOGY/ONCOLOGY | Age: 73
End: 2023-02-17
Attending: INTERNAL MEDICINE
Payer: MEDICARE

## 2023-02-17 VITALS
OXYGEN SATURATION: 95 % | SYSTOLIC BLOOD PRESSURE: 150 MMHG | TEMPERATURE: 98 F | DIASTOLIC BLOOD PRESSURE: 81 MMHG | HEART RATE: 93 BPM | WEIGHT: 293 LBS | BODY MASS INDEX: 46 KG/M2

## 2023-02-17 DIAGNOSIS — Z85.3 HISTORY OF BREAST CANCER IN FEMALE: ICD-10-CM

## 2023-02-17 DIAGNOSIS — I26.09 OTHER ACUTE PULMONARY EMBOLISM WITH ACUTE COR PULMONALE (HCC): Primary | ICD-10-CM

## 2023-02-17 DIAGNOSIS — I26.09 OTHER ACUTE PULMONARY EMBOLISM WITH ACUTE COR PULMONALE (HCC): ICD-10-CM

## 2023-02-17 DIAGNOSIS — E66.01 MORBID OBESITY WITH BMI OF 50.0-59.9, ADULT (HCC): ICD-10-CM

## 2023-02-17 LAB — INR: 2.6 (ref 0.8–1.3)

## 2023-02-17 PROCEDURE — 99213 OFFICE O/P EST LOW 20 MIN: CPT | Performed by: INTERNAL MEDICINE

## 2023-02-17 RX ORDER — ROSUVASTATIN CALCIUM 5 MG/1
5 TABLET, COATED ORAL DAILY
COMMUNITY
Start: 2023-01-12

## 2023-02-17 NOTE — PROGRESS NOTES
Patient is here for follow up for breast cancer. She is taking a new OTC supplement relaxamag recommended by her PCP. This helps her sleep so she does not have leg cramps. She stopped taking her calcium/magnesium supplement as taking both caused diarrhea. She is feeling well. She eats less then she was and is losing some weight, which she is trying to do. Her energy is good. She is taking coumadin for PE and this has been therapeutic. She denies any shortness of breath. Her mammogram was done in September 2022. She had labs at [facility] in January that I have not been able to access.      Education Record    Learner:  Patient    Disease / Diagnosis: Breast cancer and PE    Barriers / Limitations:  None   Comments:    Method:  Discussion   Comments:    General Topics:  Side effects and symptom management   Comments:    Outcome:  Shows understanding   Comments:

## 2023-02-24 DIAGNOSIS — I26.09 OTHER ACUTE PULMONARY EMBOLISM WITH ACUTE COR PULMONALE (HCC): ICD-10-CM

## 2023-02-24 RX ORDER — WARFARIN SODIUM 2.5 MG/1
TABLET ORAL
Qty: 180 TABLET | Refills: 1 | Status: SHIPPED | OUTPATIENT
Start: 2023-02-24

## 2023-03-20 ENCOUNTER — ANTI-COAG VISIT (OUTPATIENT)
Dept: HEMATOLOGY/ONCOLOGY | Age: 73
End: 2023-03-20
Attending: INTERNAL MEDICINE
Payer: MEDICARE

## 2023-03-20 DIAGNOSIS — I26.09 OTHER ACUTE PULMONARY EMBOLISM WITH ACUTE COR PULMONALE (HCC): ICD-10-CM

## 2023-03-20 LAB — INR: 2.1 (ref 0.8–1.3)

## 2023-03-20 PROCEDURE — 85610 PROTHROMBIN TIME: CPT

## 2023-03-20 PROCEDURE — 36416 COLLJ CAPILLARY BLOOD SPEC: CPT

## 2023-03-22 ENCOUNTER — APPOINTMENT (OUTPATIENT)
Dept: HEMATOLOGY/ONCOLOGY | Age: 73
End: 2023-03-22
Attending: INTERNAL MEDICINE
Payer: MEDICARE

## 2023-04-17 ENCOUNTER — ANTI-COAG VISIT (OUTPATIENT)
Dept: HEMATOLOGY/ONCOLOGY | Age: 73
End: 2023-04-17
Attending: INTERNAL MEDICINE
Payer: MEDICARE

## 2023-04-17 DIAGNOSIS — I26.09 OTHER ACUTE PULMONARY EMBOLISM WITH ACUTE COR PULMONALE (HCC): ICD-10-CM

## 2023-04-17 LAB — INR: 2 (ref 0.8–1.3)

## 2023-04-17 PROCEDURE — 85610 PROTHROMBIN TIME: CPT

## 2023-04-17 PROCEDURE — 36416 COLLJ CAPILLARY BLOOD SPEC: CPT

## 2023-05-22 ENCOUNTER — ANTI-COAG VISIT (OUTPATIENT)
Dept: HEMATOLOGY/ONCOLOGY | Age: 73
End: 2023-05-22
Attending: INTERNAL MEDICINE
Payer: MEDICARE

## 2023-05-22 DIAGNOSIS — I26.09 OTHER ACUTE PULMONARY EMBOLISM WITH ACUTE COR PULMONALE (HCC): ICD-10-CM

## 2023-05-22 LAB — INR: 2.1 (ref 0.8–1.3)

## 2023-05-22 PROCEDURE — 36416 COLLJ CAPILLARY BLOOD SPEC: CPT

## 2023-05-22 PROCEDURE — 85610 PROTHROMBIN TIME: CPT

## 2023-06-19 ENCOUNTER — ANTI-COAG VISIT (OUTPATIENT)
Dept: HEMATOLOGY/ONCOLOGY | Age: 73
End: 2023-06-19
Attending: INTERNAL MEDICINE
Payer: MEDICARE

## 2023-06-19 DIAGNOSIS — I26.09 OTHER ACUTE PULMONARY EMBOLISM WITH ACUTE COR PULMONALE (HCC): Primary | ICD-10-CM

## 2023-06-19 LAB — INR: 2.1 (ref 0.8–1.3)

## 2023-06-19 PROCEDURE — 85610 PROTHROMBIN TIME: CPT

## 2023-06-19 PROCEDURE — 36416 COLLJ CAPILLARY BLOOD SPEC: CPT

## 2023-07-17 ENCOUNTER — ANTI-COAG VISIT (OUTPATIENT)
Dept: HEMATOLOGY/ONCOLOGY | Age: 73
End: 2023-07-17
Attending: INTERNAL MEDICINE
Payer: MEDICARE

## 2023-07-17 DIAGNOSIS — I26.09 OTHER ACUTE PULMONARY EMBOLISM WITH ACUTE COR PULMONALE (HCC): Primary | ICD-10-CM

## 2023-07-17 LAB — INR: 2.1 (ref 0.8–1.3)

## 2023-07-17 PROCEDURE — 36416 COLLJ CAPILLARY BLOOD SPEC: CPT

## 2023-07-17 PROCEDURE — 85610 PROTHROMBIN TIME: CPT

## 2023-08-14 ENCOUNTER — ANTI-COAG VISIT (OUTPATIENT)
Dept: HEMATOLOGY/ONCOLOGY | Age: 73
End: 2023-08-14
Attending: INTERNAL MEDICINE
Payer: MEDICARE

## 2023-08-14 DIAGNOSIS — I26.09 OTHER ACUTE PULMONARY EMBOLISM WITH ACUTE COR PULMONALE (HCC): Primary | ICD-10-CM

## 2023-08-14 LAB — INR: 2.1 (ref 0.8–1.3)

## 2023-08-14 PROCEDURE — 85610 PROTHROMBIN TIME: CPT

## 2023-08-14 PROCEDURE — 36416 COLLJ CAPILLARY BLOOD SPEC: CPT

## 2023-09-18 ENCOUNTER — ANTI-COAG VISIT (OUTPATIENT)
Dept: HEMATOLOGY/ONCOLOGY | Age: 73
End: 2023-09-18
Attending: INTERNAL MEDICINE
Payer: MEDICARE

## 2023-09-18 DIAGNOSIS — I26.09 OTHER ACUTE PULMONARY EMBOLISM WITH ACUTE COR PULMONALE (HCC): Primary | ICD-10-CM

## 2023-09-18 LAB — INR: 2 (ref 0.8–1.3)

## 2023-09-18 PROCEDURE — 85610 PROTHROMBIN TIME: CPT

## 2023-09-18 PROCEDURE — 36416 COLLJ CAPILLARY BLOOD SPEC: CPT

## 2023-10-16 ENCOUNTER — ANTI-COAG VISIT (OUTPATIENT)
Dept: HEMATOLOGY/ONCOLOGY | Age: 73
End: 2023-10-16
Attending: INTERNAL MEDICINE
Payer: MEDICARE

## 2023-10-16 DIAGNOSIS — I26.09 OTHER ACUTE PULMONARY EMBOLISM WITH ACUTE COR PULMONALE (HCC): Primary | ICD-10-CM

## 2023-10-16 LAB — INR: 2.3 (ref 0.8–1.3)

## 2023-10-16 PROCEDURE — 36416 COLLJ CAPILLARY BLOOD SPEC: CPT

## 2023-10-16 PROCEDURE — 85610 PROTHROMBIN TIME: CPT

## 2023-11-13 ENCOUNTER — ANTI-COAG VISIT (OUTPATIENT)
Dept: HEMATOLOGY/ONCOLOGY | Age: 73
End: 2023-11-13
Attending: INTERNAL MEDICINE
Payer: MEDICARE

## 2023-11-13 DIAGNOSIS — I26.09 OTHER ACUTE PULMONARY EMBOLISM WITH ACUTE COR PULMONALE (HCC): Primary | ICD-10-CM

## 2023-11-13 LAB — INR: 1.9 (ref 0.8–1.3)

## 2023-11-13 PROCEDURE — 36416 COLLJ CAPILLARY BLOOD SPEC: CPT

## 2023-11-13 PROCEDURE — 85610 PROTHROMBIN TIME: CPT

## 2023-12-11 ENCOUNTER — ANTI-COAG VISIT (OUTPATIENT)
Dept: HEMATOLOGY/ONCOLOGY | Age: 73
End: 2023-12-11
Attending: INTERNAL MEDICINE
Payer: MEDICARE

## 2023-12-11 DIAGNOSIS — I26.09 OTHER ACUTE PULMONARY EMBOLISM WITH ACUTE COR PULMONALE (HCC): Primary | ICD-10-CM

## 2023-12-11 LAB — INR: 1.7 (ref 0.8–1.3)

## 2023-12-11 PROCEDURE — 36416 COLLJ CAPILLARY BLOOD SPEC: CPT

## 2023-12-11 PROCEDURE — 85610 PROTHROMBIN TIME: CPT

## 2023-12-18 ENCOUNTER — ANTI-COAG VISIT (OUTPATIENT)
Dept: HEMATOLOGY/ONCOLOGY | Age: 73
End: 2023-12-18
Attending: INTERNAL MEDICINE
Payer: MEDICARE

## 2023-12-18 DIAGNOSIS — I26.09 OTHER ACUTE PULMONARY EMBOLISM WITH ACUTE COR PULMONALE (HCC): Primary | ICD-10-CM

## 2023-12-18 LAB — INR: 1.9 (ref 0.8–1.3)

## 2023-12-18 PROCEDURE — 36416 COLLJ CAPILLARY BLOOD SPEC: CPT

## 2023-12-27 ENCOUNTER — ANTI-COAG VISIT (OUTPATIENT)
Dept: HEMATOLOGY/ONCOLOGY | Age: 73
End: 2023-12-27
Attending: INTERNAL MEDICINE
Payer: MEDICARE

## 2023-12-27 DIAGNOSIS — I26.09 OTHER ACUTE PULMONARY EMBOLISM WITH ACUTE COR PULMONALE (HCC): Primary | ICD-10-CM

## 2023-12-27 LAB — INR: 2.3 (ref 0.8–1.3)

## 2023-12-27 PROCEDURE — 85610 PROTHROMBIN TIME: CPT

## 2023-12-27 PROCEDURE — 36416 COLLJ CAPILLARY BLOOD SPEC: CPT

## 2024-01-08 DIAGNOSIS — I26.09 OTHER ACUTE PULMONARY EMBOLISM WITH ACUTE COR PULMONALE (HCC): ICD-10-CM

## 2024-01-08 RX ORDER — WARFARIN SODIUM 2.5 MG/1
TABLET ORAL
Qty: 180 TABLET | Refills: 1 | Status: SHIPPED | OUTPATIENT
Start: 2024-01-08

## 2024-01-10 ENCOUNTER — ANTI-COAG VISIT (OUTPATIENT)
Dept: HEMATOLOGY/ONCOLOGY | Age: 74
End: 2024-01-10
Attending: INTERNAL MEDICINE
Payer: MEDICARE

## 2024-01-10 DIAGNOSIS — I26.09 OTHER ACUTE PULMONARY EMBOLISM WITH ACUTE COR PULMONALE (HCC): Primary | ICD-10-CM

## 2024-01-10 LAB
INR BLD: 1.74 (ref 0.8–1.2)
PROTHROMBIN TIME: 20.5 SECONDS (ref 11.6–14.8)

## 2024-01-10 PROCEDURE — 85610 PROTHROMBIN TIME: CPT

## 2024-01-10 PROCEDURE — 36415 COLL VENOUS BLD VENIPUNCTURE: CPT

## 2024-01-17 ENCOUNTER — ANTI-COAG VISIT (OUTPATIENT)
Dept: HEMATOLOGY/ONCOLOGY | Age: 74
End: 2024-01-17
Attending: INTERNAL MEDICINE
Payer: MEDICARE

## 2024-01-17 DIAGNOSIS — I26.09 OTHER ACUTE PULMONARY EMBOLISM WITH ACUTE COR PULMONALE (HCC): ICD-10-CM

## 2024-01-17 LAB
INR BLD: 2.21 (ref 0.8–1.2)
PROTHROMBIN TIME: 24.8 SECONDS (ref 11.6–14.8)

## 2024-01-17 PROCEDURE — 85610 PROTHROMBIN TIME: CPT

## 2024-01-17 PROCEDURE — 36415 COLL VENOUS BLD VENIPUNCTURE: CPT

## 2024-01-24 ENCOUNTER — ANTI-COAG VISIT (OUTPATIENT)
Dept: HEMATOLOGY/ONCOLOGY | Age: 74
End: 2024-01-24
Attending: INTERNAL MEDICINE
Payer: MEDICARE

## 2024-01-24 DIAGNOSIS — I26.09 OTHER ACUTE PULMONARY EMBOLISM WITH ACUTE COR PULMONALE (HCC): ICD-10-CM

## 2024-01-24 LAB
INR BLD: 2.78 (ref 0.8–1.2)
PROTHROMBIN TIME: 29.8 SECONDS (ref 11.6–14.8)

## 2024-01-24 PROCEDURE — 85610 PROTHROMBIN TIME: CPT

## 2024-01-24 PROCEDURE — 36415 COLL VENOUS BLD VENIPUNCTURE: CPT

## 2024-02-07 ENCOUNTER — APPOINTMENT (OUTPATIENT)
Dept: HEMATOLOGY/ONCOLOGY | Facility: HOSPITAL | Age: 74
End: 2024-02-07
Attending: INTERNAL MEDICINE
Payer: MEDICARE

## 2024-02-07 ENCOUNTER — ANTI-COAG VISIT (OUTPATIENT)
Dept: HEMATOLOGY/ONCOLOGY | Age: 74
End: 2024-02-07
Attending: INTERNAL MEDICINE
Payer: MEDICARE

## 2024-02-07 DIAGNOSIS — I26.09 OTHER ACUTE PULMONARY EMBOLISM WITH ACUTE COR PULMONALE (HCC): ICD-10-CM

## 2024-02-07 LAB
INR BLD: 2.23 (ref 0.8–1.2)
PROTHROMBIN TIME: 24.9 SECONDS (ref 11.6–14.8)

## 2024-02-07 PROCEDURE — 36415 COLL VENOUS BLD VENIPUNCTURE: CPT

## 2024-02-07 PROCEDURE — 85610 PROTHROMBIN TIME: CPT

## 2024-03-06 ENCOUNTER — ANTI-COAG VISIT (OUTPATIENT)
Dept: HEMATOLOGY/ONCOLOGY | Age: 74
End: 2024-03-06
Attending: INTERNAL MEDICINE
Payer: MEDICARE

## 2024-03-06 ENCOUNTER — TELEPHONE (OUTPATIENT)
Dept: HEMATOLOGY/ONCOLOGY | Facility: HOSPITAL | Age: 74
End: 2024-03-06

## 2024-03-06 DIAGNOSIS — I26.09 OTHER ACUTE PULMONARY EMBOLISM WITH ACUTE COR PULMONALE (HCC): ICD-10-CM

## 2024-03-06 LAB
INR BLD: 1.96 (ref 0.8–1.2)
PROTHROMBIN TIME: 22.6 SECONDS (ref 11.6–14.8)

## 2024-03-06 PROCEDURE — 85610 PROTHROMBIN TIME: CPT

## 2024-03-06 PROCEDURE — 36415 COLL VENOUS BLD VENIPUNCTURE: CPT

## 2024-03-06 NOTE — TELEPHONE ENCOUNTER
Called patient advised to continue same dose of coumadin, recheck in 4 weeks. (Details in anti coag encounter). Patient conveyed understanding.

## 2024-04-03 ENCOUNTER — ANTI-COAG VISIT (OUTPATIENT)
Dept: HEMATOLOGY/ONCOLOGY | Age: 74
End: 2024-04-03
Attending: INTERNAL MEDICINE
Payer: MEDICARE

## 2024-04-03 DIAGNOSIS — I26.09 OTHER ACUTE PULMONARY EMBOLISM WITH ACUTE COR PULMONALE (HCC): ICD-10-CM

## 2024-04-03 LAB
INR BLD: 2.3 (ref 0.8–1.2)
PROTHROMBIN TIME: 25.6 SECONDS (ref 11.6–14.8)

## 2024-04-03 PROCEDURE — 85610 PROTHROMBIN TIME: CPT

## 2024-04-03 PROCEDURE — 36415 COLL VENOUS BLD VENIPUNCTURE: CPT

## 2024-04-03 NOTE — PROGRESS NOTES
Called patient about INR.  She told the MA that she has had chest pain and eye floaters and thought her INR might be too low.    Patient reports that she had pain in her chest after carrying a heavy box. She does not have chest pain today.   She has an appointment scheduled with her eye doctor this week.  She has had floaters in her eyes before.   Instructed that any worsening chest pain she should go to ED.

## 2024-05-01 ENCOUNTER — ANTI-COAG VISIT (OUTPATIENT)
Dept: HEMATOLOGY/ONCOLOGY | Age: 74
End: 2024-05-01
Attending: INTERNAL MEDICINE
Payer: MEDICARE

## 2024-05-01 DIAGNOSIS — I26.09 OTHER ACUTE PULMONARY EMBOLISM WITH ACUTE COR PULMONALE (HCC): Primary | ICD-10-CM

## 2024-05-01 LAB
INR BLD: 2.16 (ref 0.8–1.2)
PROTHROMBIN TIME: 24.3 SECONDS (ref 11.6–14.8)

## 2024-05-01 PROCEDURE — 36415 COLL VENOUS BLD VENIPUNCTURE: CPT

## 2024-05-01 PROCEDURE — 85610 PROTHROMBIN TIME: CPT

## 2024-05-03 ENCOUNTER — HOSPITAL ENCOUNTER (OUTPATIENT)
Dept: MAMMOGRAPHY | Age: 74
Discharge: HOME | End: 2024-05-03
Attending: FAMILY MEDICINE
Payer: MEDICARE

## 2024-05-03 DIAGNOSIS — Z12.31 ENCOUNTER FOR SCREENING MAMMOGRAM FOR MALIGNANT NEOPLASM OF BREAST: ICD-10-CM

## 2024-05-03 PROCEDURE — 77063 BREAST TOMOSYNTHESIS BI: CPT | Performed by: FAMILY MEDICINE

## 2024-05-03 PROCEDURE — 77067 SCR MAMMO BI INCL CAD: CPT | Performed by: FAMILY MEDICINE

## 2024-05-24 ENCOUNTER — APPOINTMENT (OUTPATIENT)
Dept: HEMATOLOGY/ONCOLOGY | Age: 74
End: 2024-05-24
Attending: INTERNAL MEDICINE
Payer: MEDICARE

## 2024-05-24 ENCOUNTER — OFFICE VISIT (OUTPATIENT)
Dept: HEMATOLOGY/ONCOLOGY | Age: 74
End: 2024-05-24
Attending: INTERNAL MEDICINE
Payer: MEDICARE

## 2024-05-24 ENCOUNTER — ANTI-COAG VISIT (OUTPATIENT)
Dept: HEMATOLOGY/ONCOLOGY | Facility: HOSPITAL | Age: 74
End: 2024-05-24

## 2024-05-24 VITALS
WEIGHT: 285 LBS | HEIGHT: 67.01 IN | OXYGEN SATURATION: 96 % | RESPIRATION RATE: 20 BRPM | TEMPERATURE: 96 F | HEART RATE: 74 BPM | BODY MASS INDEX: 44.73 KG/M2 | DIASTOLIC BLOOD PRESSURE: 83 MMHG | SYSTOLIC BLOOD PRESSURE: 151 MMHG

## 2024-05-24 DIAGNOSIS — Z85.3 HISTORY OF BREAST CANCER IN FEMALE: Primary | ICD-10-CM

## 2024-05-24 DIAGNOSIS — E66.01 MORBID OBESITY WITH BMI OF 50.0-59.9, ADULT (HCC): ICD-10-CM

## 2024-05-24 DIAGNOSIS — I26.09 OTHER ACUTE PULMONARY EMBOLISM WITH ACUTE COR PULMONALE (HCC): ICD-10-CM

## 2024-05-24 DIAGNOSIS — I26.09 OTHER ACUTE PULMONARY EMBOLISM WITH ACUTE COR PULMONALE (HCC): Primary | ICD-10-CM

## 2024-05-24 LAB
INR BLD: 2.34 (ref 0.8–1.2)
PROTHROMBIN TIME: 25.9 SECONDS (ref 11.6–14.8)

## 2024-05-24 PROCEDURE — 99213 OFFICE O/P EST LOW 20 MIN: CPT | Performed by: INTERNAL MEDICINE

## 2024-05-24 NOTE — PROGRESS NOTES
Patient here for 1 year f/u for VTE. Patient currently on coumadin without any issues. Patient has further concerns or complaints at this time.    Education Record    Learner:  Patient    Disease / Diagnosis: hx of VTE    Barriers / Limitations:  None   Comments:    Method:  Discussion   Comments:    General Topics:  Medication and Plan of care reviewed   Comments:    Outcome:  Shows understanding   Comments:

## 2024-05-24 NOTE — PROGRESS NOTES
Cancer Center Progress Note    Problem List:      Patient Active Problem List   Diagnosis    Examination of participant in clinical trial    Malignant neoplasm of breast (female), unspecified site    Chest pain of uncertain etiology    Elevated troponin    Positive D dimer    Morbid obesity with BMI of 50.0-59.9, adult (HCC)    Hyponatremia    Pulmonary embolus (HCC)    History of breast cancer in female    High risk medication use    Benign essential HTN    Other acute pulmonary embolism with acute cor pulmonale (HCC)       Interim History:    Sandra Golden presents today for evaluation and management of a diagnosis of VTE.     She is on warfarin daily. She is on stable dosing. She has had reasonable management. She now has had six months of anticoagulation. She has no new complaints. She has no fever or sweats. She has no chest pain. She has improved breathing. She has no new leg pain or swelling.    Review of Systems:   Constitutional: Negative for anorexia, fatigue, fevers, chills, night sweats and weight loss.  Psychiatric: The patient's mood was calm and appropriate for this visit.  The pertinent positives and negatives were described. All other systems were negative.    PMH/PSH:  Past Medical History:    Breast CA (HCC)    right breast cancer with lumpectomy, chemo and radiation.    Essential hypertension    High blood pressure    Hyperglycemia    Visual impairment       Past Surgical History:   Procedure Laterality Date    Breast surgery      Chemotherapy Right 2001    right breast lumpectomy with chemo and rad.    Eye surgery      Lumpectomy right Right 2001    chemo and radiation also.    Needle biopsy right Right 2001    lumpectomy, chemo and radiation to follow.    Radiation right Right 2001    lumpectomy with chemo and rad.       Family History Reviewed:  Family History   Problem Relation Age of Onset    Breast Cancer Self 51        rt breast cancer.    Breast Cancer Maternal Grandmother 50        In  her 50's.       Allergies:     Allergies   Allergen Reactions    Demerol Hcl [Meperidine] UNKNOWN    Grass UNKNOWN    Penicillins UNKNOWN       Medications:   warfarin 2.5 MG Oral Tab Take 1 or 2 tabs nightly. Adjust dosing based on PT/INR monitoring. Current dose 5mg Mon-Wed-Fri/2.5 x 4 days of the week. 180 tablet 1    rosuvastatin 5 MG Oral Tab Take 1 tablet (5 mg total) by mouth daily.      Cholecalciferol (VITAMIN D3) 125 MCG (5000 UT) Oral Tab Take by mouth.      Fexofenadine HCl (ALLEGRA ALLERGY OR) Take by mouth as needed (Allergies).      Nutritional Supplements (GRAPESEED EXTRACT OR) Take by mouth.      Ocuvite-Lutein Oral Tab Take 1 tablet by mouth.      Multiple Vitamins-Minerals (LUTEIN-ZEAXANTHIN OR) Take by mouth.      Irbesartan-hydroCHLOROthiazide 150-12.5 MG Oral Tab Take 1 tablet by mouth daily. 30 tablet 3    metFORMIN HCl 500 MG Oral Tab Take 1 tablet (500 mg total) by mouth daily with breakfast.      Fluticasone Propionate (FLONASE NA) 1-2 sprays by Nasal route daily.        Ascorbic Acid (VITAMIN C) 1000 MG Oral Tab Take 1 tablet (1,000 mg total) by mouth daily.      Chromium 200 MCG Oral Cap Take 200 mcg by mouth daily.      Probiotic Product (PROBIOTIC-10 OR) Take 1 tablet by mouth daily.           Vital Signs:      Height: 170.2 cm (5' 7.01\") (05/24 1259)  Weight: 129.3 kg (285 lb) (05/24 1259)  BSA (Calculated - sq m): 2.35 sq meters (05/24 1259)  Pulse: 74 (05/24 1259)  BP: 151/83 (05/24 1259)  Temp: 96 °F (35.6 °C) (05/24 1259)  Do Not Use - Resp Rate: --  SpO2: 96 % (05/24 1259)      Physical Examination:    Constitutional: Patient is alert and not in acute distress.  Respiratory: Clear to auscultation and percussion. No rales.  No wheezes.  Cardiovascular: Regular rate and rhythm. No murmurs.  Gastrointestinal: Soft, Obese, non tender with good bowel sounds.  Musculoskeletal: No edema. No calf tenderness.  Psychiatric: The patient's mood is calm and appropriate for this  visit.      Labs reviewed at this visit:     Lab Results   Component Value Date    WBC 6.6 03/25/2022    RBC 4.13 03/25/2022    HGB 13.2 03/25/2022    HCT 39.9 03/25/2022    MCV 96.6 03/25/2022    MCH 32.0 03/25/2022    MCHC 33.1 03/25/2022    RDW 14.5 03/25/2022    .0 03/25/2022     Lab Results   Component Value Date     03/25/2022    K 3.8 03/25/2022     03/25/2022    CO2 25.0 03/25/2022    BUN 21 (H) 03/25/2022    CREATSERUM 1.07 (H) 03/25/2022     (H) 03/25/2022    CA 9.2 03/25/2022    ALKPHO 106 03/25/2022    ALT 33 03/25/2022    AST 31 03/25/2022    BILT 0.4 03/25/2022    ALB 3.1 (L) 03/25/2022    TP 7.7 03/25/2022     Recent Labs   Lab 05/24/24  1258   INR 2.34*        Radiologic imaging reviewed at this visit:    Mammogram on 9/28/2022:  BREAST COMPOSITION:   Scattered areas fibroglandular density.       FINDINGS:  Large dystrophic calcifications in right breast are noted in area of previous lumpectomy.  There diffuse dense vascular calcifications throughout both breasts which are benign.  There is no new spiculated mass or area of architectural distortion.      Impression   CONCLUSION:         BI-RADS CATEGORY:     DIAGNOSTIC CATEGORY 2--BENIGN FINDING:         RECOMMENDATIONS:     ROUTINE MAMMOGRAM AND CLINICAL EVALUATION IN 12 MONTHS.            CT of pelvis on 9/20/2020:  FINDINGS:    Please note the evaluation is somewhat limited without intravenous or oral contrast.     There is mild asymmetric enlargement of the right medial thigh muscles, in particular at the level of the lower pelvis.  Subtle hyperdensity in this region most likely represents a small amount of intra muscular hematoma.  This is best seen on series 2, image 67. This appears to be centered on an adductor muscle, possibly the adductor longus.  This area measures approximately 5.2 x 2.1 cm (series 2, image 67).  No evidence of retroperitoneal hematoma.  Mild local inflammatory changes are likely present with  some fat stranding of the soft tissues of the medial right proximal thigh.     No free fluid is seen within the pelvis.  Urinary bladder is decompressed which limits assessment.  No pelvic lymphadenopathy is seen.    =====  CONCLUSION:  Mild asymmetric enlargement of the medial right proximal thigh/pelvic muscles with some minimal local inflammatory changes.  Subtle hyperdensity in this region most likely represents small intramuscular hematoma, possibly involving the abductor longus muscle.  Lack of intravenous contrast limits assessment for active extravasation.  Continued follow-up is suggested.     Assessment/Plan:     1. Bilateral pulmonary embolism s/p lytics:     She is doing well. We will continue to adjust the warfarin per protocol. I recommend continued indefinite anticoagulation with warfarin. We had tried to put her on rivaroxaban but this was too expensive. She will stay on dose adjusted warfarin.     2. H/O breast cancer:     CHRISTA with distant h/o of right breast cancer. I recommended yearly mammogram screeening.     3. Morbid obesity:    4. Elevated total protein:    No monoclonal protein. This is polyclonal. Will follow.        Dariel Canales MD

## 2024-06-21 ENCOUNTER — TELEPHONE (OUTPATIENT)
Dept: HEMATOLOGY/ONCOLOGY | Facility: HOSPITAL | Age: 74
End: 2024-06-21

## 2024-06-21 ENCOUNTER — ANTI-COAG VISIT (OUTPATIENT)
Dept: HEMATOLOGY/ONCOLOGY | Facility: HOSPITAL | Age: 74
End: 2024-06-21

## 2024-06-21 ENCOUNTER — ANTI-COAG VISIT (OUTPATIENT)
Dept: HEMATOLOGY/ONCOLOGY | Age: 74
End: 2024-06-21
Attending: INTERNAL MEDICINE

## 2024-06-21 DIAGNOSIS — I26.09 OTHER ACUTE PULMONARY EMBOLISM WITH ACUTE COR PULMONALE (HCC): ICD-10-CM

## 2024-06-21 DIAGNOSIS — I26.09 OTHER ACUTE PULMONARY EMBOLISM WITH ACUTE COR PULMONALE (HCC): Primary | ICD-10-CM

## 2024-06-21 LAB
INR BLD: 2.29 (ref 0.8–1.2)
PROTHROMBIN TIME: 25.5 SECONDS (ref 11.6–14.8)

## 2024-06-21 PROCEDURE — 85610 PROTHROMBIN TIME: CPT

## 2024-06-21 PROCEDURE — 36415 COLL VENOUS BLD VENIPUNCTURE: CPT

## 2024-06-21 NOTE — TELEPHONE ENCOUNTER
Called patient regarding INR of 2.29. Patient to continue with 2.5 mg Sunday/Tuesday/Thursday 5 mg all other daysh and recheck in 4 weeks. Patient conveyed understanding. Sent message to PSR team to schedule patient.

## 2024-07-10 ENCOUNTER — TELEPHONE (OUTPATIENT)
Dept: HEMATOLOGY/ONCOLOGY | Facility: HOSPITAL | Age: 74
End: 2024-07-10

## 2024-07-10 ENCOUNTER — ANTI-COAG VISIT (OUTPATIENT)
Dept: HEMATOLOGY/ONCOLOGY | Facility: HOSPITAL | Age: 74
End: 2024-07-10

## 2024-07-10 ENCOUNTER — ANTI-COAG VISIT (OUTPATIENT)
Dept: HEMATOLOGY/ONCOLOGY | Age: 74
End: 2024-07-10
Attending: INTERNAL MEDICINE
Payer: MEDICARE

## 2024-07-10 DIAGNOSIS — I26.09 OTHER ACUTE PULMONARY EMBOLISM WITH ACUTE COR PULMONALE (HCC): ICD-10-CM

## 2024-07-10 DIAGNOSIS — I26.09 OTHER ACUTE PULMONARY EMBOLISM WITH ACUTE COR PULMONALE (HCC): Primary | ICD-10-CM

## 2024-07-10 LAB
INR BLD: 2.25 (ref 0.8–1.2)
PROTHROMBIN TIME: 25.1 SECONDS (ref 11.6–14.8)

## 2024-07-10 PROCEDURE — 85610 PROTHROMBIN TIME: CPT

## 2024-07-10 PROCEDURE — 36415 COLL VENOUS BLD VENIPUNCTURE: CPT

## 2024-07-10 RX ORDER — WARFARIN SODIUM 2.5 MG/1
TABLET ORAL
Qty: 180 TABLET | Refills: 1 | Status: SHIPPED | OUTPATIENT
Start: 2024-07-10

## 2024-08-07 ENCOUNTER — APPOINTMENT (OUTPATIENT)
Dept: HEMATOLOGY/ONCOLOGY | Age: 74
End: 2024-08-07
Attending: INTERNAL MEDICINE
Payer: MEDICARE

## 2024-08-08 ENCOUNTER — TELEPHONE (OUTPATIENT)
Dept: HEMATOLOGY/ONCOLOGY | Facility: HOSPITAL | Age: 74
End: 2024-08-08

## 2024-08-08 NOTE — TELEPHONE ENCOUNTER
Called patient with instructions to reschedule INR for next week.  Transferred her to scheduling.

## 2024-08-08 NOTE — TELEPHONE ENCOUNTER
Patient called, her  is in the hospital with pneumonia probably Covid induced.  So she took a at home Covid test today and it is positive.  Instructed for her to reach  out to PCP regarding any tx for her regarding Covid.  However she has an apt for INR for tomorrow at 1100 am and wondering what she needs to do.  Was told she could visit  if she wears a mask.    Current dose of Coumadin 2.5 mg three times per week; 5mg 4 times per week.  Has not needed dose adjustments for months per patient.    Requesting a call back with instructions.

## 2024-08-09 ENCOUNTER — APPOINTMENT (OUTPATIENT)
Dept: HEMATOLOGY/ONCOLOGY | Age: 74
End: 2024-08-09
Attending: INTERNAL MEDICINE
Payer: MEDICARE

## 2024-08-16 ENCOUNTER — OFFICE VISIT (OUTPATIENT)
Dept: HEMATOLOGY/ONCOLOGY | Age: 74
End: 2024-08-16
Attending: INTERNAL MEDICINE
Payer: MEDICARE

## 2024-08-16 ENCOUNTER — APPOINTMENT (OUTPATIENT)
Dept: HEMATOLOGY/ONCOLOGY | Age: 74
End: 2024-08-16
Attending: INTERNAL MEDICINE
Payer: MEDICARE

## 2024-08-16 ENCOUNTER — ANTI-COAG VISIT (OUTPATIENT)
Dept: HEMATOLOGY/ONCOLOGY | Facility: HOSPITAL | Age: 74
End: 2024-08-16

## 2024-08-16 VITALS
HEIGHT: 67.01 IN | WEIGHT: 276.5 LBS | BODY MASS INDEX: 43.4 KG/M2 | RESPIRATION RATE: 20 BRPM | DIASTOLIC BLOOD PRESSURE: 76 MMHG | TEMPERATURE: 97 F | SYSTOLIC BLOOD PRESSURE: 110 MMHG | OXYGEN SATURATION: 97 % | HEART RATE: 89 BPM

## 2024-08-16 DIAGNOSIS — I26.09 OTHER ACUTE PULMONARY EMBOLISM WITH ACUTE COR PULMONALE (HCC): Primary | ICD-10-CM

## 2024-08-16 DIAGNOSIS — Z85.3 HISTORY OF BREAST CANCER IN FEMALE: ICD-10-CM

## 2024-08-16 DIAGNOSIS — E66.01 MORBID OBESITY WITH BMI OF 50.0-59.9, ADULT (HCC): ICD-10-CM

## 2024-08-16 LAB
INR BLD: 2.4 (ref 0.8–1.2)
PROTHROMBIN TIME: 26.4 SECONDS (ref 11.6–14.8)

## 2024-08-16 PROCEDURE — 99213 OFFICE O/P EST LOW 20 MIN: CPT | Performed by: INTERNAL MEDICINE

## 2024-08-16 NOTE — PROGRESS NOTES
Cancer Center Progress Note    Problem List:      Patient Active Problem List   Diagnosis    Examination of participant in clinical trial    Malignant neoplasm of breast (female), unspecified site    Chest pain of uncertain etiology    Elevated troponin    Positive D dimer    Morbid obesity with BMI of 50.0-59.9, adult (HCC)    Hyponatremia    Pulmonary embolus (HCC)    History of breast cancer in female    High risk medication use    Benign essential HTN    Other acute pulmonary embolism with acute cor pulmonale (HCC)       Interim History:    Sandra Golden presents today for evaluation and management of a diagnosis of VTE.     She was diagnosed with COVID infection about 9 days ago. She has no symptoms over the last 9 days. She tested because of an exposure. She has no chest pain. She has no dyspnea and minimal cough. She has no fever.     She is on warfarin daily. She is on stable dosing. She has had reasonable management.     Review of Systems:   Constitutional: Negative for anorexia, fatigue, fevers, chills, night sweats and weight loss.  Psychiatric: The patient's mood was calm and appropriate for this visit.  The pertinent positives and negatives were described. All other systems were negative.    PMH/PSH:  Past Medical History:    Breast CA (HCC)    right breast cancer with lumpectomy, chemo and radiation.    Essential hypertension    High blood pressure    Hyperglycemia    Visual impairment       Past Surgical History:   Procedure Laterality Date    Breast surgery      Chemotherapy Right 2001    right breast lumpectomy with chemo and rad.    Eye surgery      Lumpectomy right Right 2001    chemo and radiation also.    Needle biopsy right Right 2001    lumpectomy, chemo and radiation to follow.    Radiation right Right 2001    lumpectomy with chemo and rad.       Family History Reviewed:  Family History   Problem Relation Age of Onset    Breast Cancer Self 51        rt breast cancer.    Breast Cancer  Maternal Grandmother 50        In her 50's.       Allergies:     Allergies   Allergen Reactions    Demerol Hcl [Meperidine] UNKNOWN    Grass UNKNOWN    Penicillins UNKNOWN       Medications:  No outpatient medications have been marked as taking for the 8/16/24 encounter (Office Visit) with Dariel Canales MD.       Vital Signs:      Height: 170.2 cm (5' 7.01\") (08/16 0958)  Weight: 125.4 kg (276 lb 8 oz) (08/16 0958)  BSA (Calculated - sq m): 2.32 sq meters (08/16 0958)  Pulse: 89 (08/16 0958)  BP: 110/76 (08/16 0958)  Temp: 97.1 °F (36.2 °C) (08/16 0958)  Do Not Use - Resp Rate: --  SpO2: 97 % (08/16 0958)      Physical Examination:    Constitutional: Patient is alert and not in acute distress.  Respiratory: Clear to auscultation and percussion. No rales.  No wheezes.  Cardiovascular: Regular rate and rhythm. No murmurs.  Gastrointestinal: Soft, Obese, non tender with good bowel sounds.  Musculoskeletal: No edema. No calf tenderness.  Psychiatric: The patient's mood is calm and appropriate for this visit.      Labs reviewed at this visit:     Lab Results   Component Value Date    WBC 6.6 03/25/2022    RBC 4.13 03/25/2022    HGB 13.2 03/25/2022    HCT 39.9 03/25/2022    MCV 96.6 03/25/2022    MCH 32.0 03/25/2022    MCHC 33.1 03/25/2022    RDW 14.5 03/25/2022    .0 03/25/2022     Lab Results   Component Value Date     03/25/2022    K 3.8 03/25/2022     03/25/2022    CO2 25.0 03/25/2022    BUN 21 (H) 03/25/2022    CREATSERUM 1.07 (H) 03/25/2022     (H) 03/25/2022    CA 9.2 03/25/2022    ALKPHO 106 03/25/2022    ALT 33 03/25/2022    AST 31 03/25/2022    BILT 0.4 03/25/2022    ALB 3.1 (L) 03/25/2022    TP 7.7 03/25/2022     No results for input(s): \"PT\", \"INR\", \"PTT\" in the last 168 hours.       Radiologic imaging reviewed at this visit:    Mammogram on 5/3/2024:  BREAST COMPOSITION:  Scattered areas fibroglandular density.     FINDINGS:  Postsurgical and post treatment changes within the right  breast.  Stable calcifications.  Stable breast parenchymal pattern . No suspicious calcifications, architectural distortion, or spiculated masses are identified.     Impression   CONCLUSION:       BI-RADS CATEGORY:    DIAGNOSTIC CATEGORY 2--BENIGN FINDING:       RECOMMENDATIONS:    ROUTINE MAMMOGRAM AND CLINICAL EVALUATION IN 12 MONTHS.         CT of pelvis on 9/20/2020:  FINDINGS:    Please note the evaluation is somewhat limited without intravenous or oral contrast.     There is mild asymmetric enlargement of the right medial thigh muscles, in particular at the level of the lower pelvis.  Subtle hyperdensity in this region most likely represents a small amount of intra muscular hematoma.  This is best seen on series 2, image 67. This appears to be centered on an adductor muscle, possibly the adductor longus.  This area measures approximately 5.2 x 2.1 cm (series 2, image 67).  No evidence of retroperitoneal hematoma.  Mild local inflammatory changes are likely present with some fat stranding of the soft tissues of the medial right proximal thigh.     No free fluid is seen within the pelvis.  Urinary bladder is decompressed which limits assessment.  No pelvic lymphadenopathy is seen.    =====  CONCLUSION:  Mild asymmetric enlargement of the medial right proximal thigh/pelvic muscles with some minimal local inflammatory changes.  Subtle hyperdensity in this region most likely represents small intramuscular hematoma, possibly involving the abductor longus muscle.  Lack of intravenous contrast limits assessment for active extravasation.  Continued follow-up is suggested.     Assessment/Plan:     1. Bilateral pulmonary embolism s/p lytics:     She is doing well. We will continue to adjust the warfarin per protocol. I recommend continued indefinite anticoagulation with warfarin. We had tried to put her on rivaroxaban but this was too expensive. She will stay on dose adjusted warfarin.     2. H/O breast cancer:     CHRISTA  with distant h/o of right breast cancer. I recommended yearly mammogram screeening.     3. Morbid obesity:    4. Elevated total protein:    No monoclonal protein. This is polyclonal. Will follow.    5. Recent COVID infection:    Asymptomatic. No other intervention.        Dariel Canales MD

## 2024-08-16 NOTE — PROGRESS NOTES
Patient is here today for follow up with   for Hx of PE - patient tested positive for COVID approximately one week ago. Patient denies pain. Feeling good now. INR drawn today. Medication list,medical history  were reviewed and updated.     Education Record    Learner:  Patient      Disease / Diagnosis: Hx Acute PE    Barriers / Limitations:  None   Comments:    Method:  Brief focused, Discussion, Printed material and Reinforcement   Comments:    General Topics:  Medication, Pain, Procedure and Plan of care reviewed   Comments:    Outcome:  Shows understanding   Comments:      AVS provided and follow up reviewed.  Patient instructed to call as needed.

## 2024-09-11 ENCOUNTER — ANTI-COAG VISIT (OUTPATIENT)
Dept: HEMATOLOGY/ONCOLOGY | Age: 74
End: 2024-09-11
Attending: INTERNAL MEDICINE
Payer: MEDICARE

## 2024-09-11 DIAGNOSIS — I26.09 OTHER ACUTE PULMONARY EMBOLISM WITH ACUTE COR PULMONALE (HCC): ICD-10-CM

## 2024-09-11 LAB
INR BLD: 2.2 (ref 0.8–1.2)
PROTHROMBIN TIME: 24.6 SECONDS (ref 11.6–14.8)

## 2024-09-11 PROCEDURE — 36415 COLL VENOUS BLD VENIPUNCTURE: CPT

## 2024-09-11 PROCEDURE — 85610 PROTHROMBIN TIME: CPT

## 2024-09-16 ENCOUNTER — TELEPHONE (OUTPATIENT)
Dept: HEMATOLOGY/ONCOLOGY | Facility: HOSPITAL | Age: 74
End: 2024-09-16

## 2024-09-16 NOTE — TELEPHONE ENCOUNTER
Called patient regarding INR results of 2.2. no change, repeat inr in 4 weeks. Patient conveyed understanding.

## 2024-10-07 ENCOUNTER — APPOINTMENT (OUTPATIENT)
Dept: CT IMAGING | Facility: HOSPITAL | Age: 74
End: 2024-10-07
Attending: EMERGENCY MEDICINE
Payer: COMMERCIAL

## 2024-10-07 ENCOUNTER — HOSPITAL ENCOUNTER (EMERGENCY)
Facility: HOSPITAL | Age: 74
Discharge: HOME | End: 2024-10-07
Attending: EMERGENCY MEDICINE
Payer: COMMERCIAL

## 2024-10-07 VITALS
TEMPERATURE: 97 F | SYSTOLIC BLOOD PRESSURE: 130 MMHG | RESPIRATION RATE: 17 BRPM | HEART RATE: 82 BPM | OXYGEN SATURATION: 100 % | DIASTOLIC BLOOD PRESSURE: 74 MMHG

## 2024-10-07 DIAGNOSIS — R07.89 CHEST WALL PAIN: ICD-10-CM

## 2024-10-07 DIAGNOSIS — R10.9 ABDOMINAL WALL PAIN: ICD-10-CM
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
## 2024-10-07 LAB
ALBUMIN SERPL-MCNC: 3.7 G/DL (ref 3.2–4.8)
ALBUMIN/GLOB SERPL: 0.9 {RATIO} (ref 1–2)
ALP LIVER SERPL-CCNC: 125 U/L
ALT SERPL-CCNC: 21 U/L
AMPHET UR QL SCN: NEGATIVE
ANION GAP SERPL CALC-SCNC: 9 MMOL/L (ref 0–18)
ANTIBODY SCREEN: NEGATIVE
AST SERPL-CCNC: 39 U/L (ref ?–34)
BASOPHILS # BLD AUTO: 0.04 X10(3) UL (ref 0–0.2)
BASOPHILS NFR BLD AUTO: 0.4 %
BENZODIAZ UR QL SCN: NEGATIVE
BILIRUB SERPL-MCNC: 0.7 MG/DL (ref 0.2–1.1)
BILIRUB UR QL STRIP.AUTO: NEGATIVE
BUN BLD-MCNC: 19 MG/DL (ref 9–23)
CALCIUM BLD-MCNC: 9.8 MG/DL (ref 8.7–10.4)
CANNABINOIDS UR QL SCN: NEGATIVE
CHLORIDE SERPL-SCNC: 102 MMOL/L (ref 98–112)
CLARITY UR REFRACT.AUTO: CLEAR
CO2 SERPL-SCNC: 24 MMOL/L (ref 21–32)
COCAINE UR QL: NEGATIVE
COLOR UR AUTO: YELLOW
CREAT BLD-MCNC: 1.14 MG/DL
CREAT UR-SCNC: 73.6 MG/DL
EGFRCR SERPLBLD CKD-EPI 2021: 50 ML/MIN/1.73M2 (ref 60–?)
EOSINOPHIL # BLD AUTO: 0.07 X10(3) UL (ref 0–0.7)
EOSINOPHIL NFR BLD AUTO: 0.7 %
ERYTHROCYTE [DISTWIDTH] IN BLOOD BY AUTOMATED COUNT: 14.6 %
ETHANOL SERPL-MCNC: <3 MG/DL (ref ?–3)
FENTANYL UR QL SCN: NEGATIVE
GLOBULIN PLAS-MCNC: 4.2 G/DL (ref 2–3.5)
GLUCOSE BLD-MCNC: 95 MG/DL (ref 70–99)
GLUCOSE UR STRIP.AUTO-MCNC: NORMAL MG/DL
HCT VFR BLD AUTO: 37.7 %
HGB BLD-MCNC: 12.9 G/DL
IMM GRANULOCYTES # BLD AUTO: 0.09 X10(3) UL (ref 0–1)
IMM GRANULOCYTES NFR BLD: 0.9 %
INR BLD: 2.18 (ref 0.8–1.2)
KETONES UR STRIP.AUTO-MCNC: NEGATIVE MG/DL
LEUKOCYTE ESTERASE UR QL STRIP.AUTO: 500
LIPASE SERPL-CCNC: 50 U/L (ref 12–53)
LYMPHOCYTES # BLD AUTO: 1.57 X10(3) UL (ref 1–4)
LYMPHOCYTES NFR BLD AUTO: 15 %
MCH RBC QN AUTO: 32.3 PG (ref 26–34)
MCHC RBC AUTO-ENTMCNC: 34.2 G/DL (ref 31–37)
MCV RBC AUTO: 94.3 FL
MDMA UR QL SCN: NEGATIVE
MONOCYTES # BLD AUTO: 0.49 X10(3) UL (ref 0.1–1)
MONOCYTES NFR BLD AUTO: 4.7 %
NEUTROPHILS # BLD AUTO: 8.22 X10 (3) UL (ref 1.5–7.7)
NEUTROPHILS # BLD AUTO: 8.22 X10(3) UL (ref 1.5–7.7)
NEUTROPHILS NFR BLD AUTO: 78.3 %
OPIATES UR QL SCN: NEGATIVE
OSMOLALITY SERPL CALC.SUM OF ELEC: 282 MOSM/KG (ref 275–295)
OXYCODONE UR QL SCN: NEGATIVE
PH UR STRIP.AUTO: 6 [PH] (ref 5–8)
PLATELET # BLD AUTO: 251 10(3)UL (ref 150–450)
POTASSIUM SERPL-SCNC: 3.8 MMOL/L (ref 3.5–5.1)
PROT SERPL-MCNC: 7.9 G/DL (ref 5.7–8.2)
PROT UR STRIP.AUTO-MCNC: NEGATIVE MG/DL
PROTHROMBIN TIME: 24.5 SECONDS (ref 11.6–14.8)
RBC # BLD AUTO: 4 X10(6)UL
RBC UR QL AUTO: NEGATIVE
RH BLOOD TYPE: POSITIVE
RH BLOOD TYPE: POSITIVE
SODIUM SERPL-SCNC: 135 MMOL/L (ref 136–145)
SP GR UR STRIP.AUTO: 1.03 (ref 1–1.03)
UROBILINOGEN UR STRIP.AUTO-MCNC: NORMAL MG/DL
WBC # BLD AUTO: 10.5 X10(3) UL (ref 4–11)
WBC #/AREA URNS AUTO: >50 /HPF
YEAST UR QL: PRESENT /HPF

## 2024-10-07 PROCEDURE — 86901 BLOOD TYPING SEROLOGIC RH(D): CPT | Performed by: EMERGENCY MEDICINE

## 2024-10-07 PROCEDURE — 93005 ELECTROCARDIOGRAM TRACING: CPT

## 2024-10-07 PROCEDURE — 93010 ELECTROCARDIOGRAM REPORT: CPT

## 2024-10-07 PROCEDURE — 85025 COMPLETE CBC W/AUTO DIFF WBC: CPT | Performed by: EMERGENCY MEDICINE

## 2024-10-07 PROCEDURE — 82077 ASSAY SPEC XCP UR&BREATH IA: CPT | Performed by: EMERGENCY MEDICINE

## 2024-10-07 PROCEDURE — 86900 BLOOD TYPING SEROLOGIC ABO: CPT | Performed by: EMERGENCY MEDICINE

## 2024-10-07 PROCEDURE — 72125 CT NECK SPINE W/O DYE: CPT | Performed by: EMERGENCY MEDICINE

## 2024-10-07 PROCEDURE — 83690 ASSAY OF LIPASE: CPT | Performed by: EMERGENCY MEDICINE

## 2024-10-07 PROCEDURE — 70450 CT HEAD/BRAIN W/O DYE: CPT | Performed by: EMERGENCY MEDICINE

## 2024-10-07 PROCEDURE — 86850 RBC ANTIBODY SCREEN: CPT | Performed by: EMERGENCY MEDICINE

## 2024-10-07 PROCEDURE — 85610 PROTHROMBIN TIME: CPT | Performed by: EMERGENCY MEDICINE

## 2024-10-07 PROCEDURE — 99285 EMERGENCY DEPT VISIT HI MDM: CPT

## 2024-10-07 PROCEDURE — 80053 COMPREHEN METABOLIC PANEL: CPT | Performed by: EMERGENCY MEDICINE

## 2024-10-07 PROCEDURE — 80307 DRUG TEST PRSMV CHEM ANLYZR: CPT | Performed by: EMERGENCY MEDICINE

## 2024-10-07 PROCEDURE — 81001 URINALYSIS AUTO W/SCOPE: CPT | Performed by: EMERGENCY MEDICINE

## 2024-10-07 PROCEDURE — 71275 CT ANGIOGRAPHY CHEST: CPT | Performed by: EMERGENCY MEDICINE

## 2024-10-07 PROCEDURE — 74177 CT ABD & PELVIS W/CONTRAST: CPT | Performed by: EMERGENCY MEDICINE

## 2024-10-07 RX ORDER — HYDROCODONE BITARTRATE AND ACETAMINOPHEN 5; 325 MG/1; MG/1
2 TABLET ORAL ONCE
Status: COMPLETED | OUTPATIENT
Start: 2024-10-07 | End: 2024-10-07

## 2024-10-07 RX ORDER — HYDROCODONE BITARTRATE AND ACETAMINOPHEN 5; 325 MG/1; MG/1
1-2 TABLET ORAL EVERY 4 HOURS PRN
Qty: 16 TABLET | Refills: 0 | Status: SHIPPED | OUTPATIENT
Start: 2024-10-07
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
## 2024-10-07 NOTE — ED QUICK NOTES
RN rounded on pt at this time. Bed in low position, wheels of cart locked. Call light is within reach of pt. Plan of care ongoing. Son arrived to cartside. Pt denies further needs at this time.

## 2024-10-08 ENCOUNTER — ANTI-COAG VISIT (OUTPATIENT)
Dept: HEMATOLOGY/ONCOLOGY | Facility: HOSPITAL | Age: 74
End: 2024-10-08

## 2024-10-08 ENCOUNTER — TELEPHONE (OUTPATIENT)
Dept: HEMATOLOGY/ONCOLOGY | Facility: HOSPITAL | Age: 74
End: 2024-10-08

## 2024-10-08 ENCOUNTER — TELEPHONE (OUTPATIENT)
Dept: HEMATOLOGY/ONCOLOGY | Age: 74
End: 2024-10-08

## 2024-10-08 DIAGNOSIS — I26.09 OTHER ACUTE PULMONARY EMBOLISM WITH ACUTE COR PULMONALE (HCC): Primary | ICD-10-CM

## 2024-10-08 LAB
ATRIAL RATE: 375 BPM
P-R INTERVAL: 120 MS
Q-T INTERVAL: 136 MS
QRS DURATION: 6 MS
QTC CALCULATION (BEZET): 240 MS
R AXIS: 0 DEGREES
T AXIS: 206 DEGREES
VENTRICULAR RATE: 187 BPM

## 2024-10-08 NOTE — TELEPHONE ENCOUNTER
Called patient regarding INR of 2.18 no change in dose, repeat INR in 4 weeks. Talked to patient and patient conveyed understanding. Message sent to PSRs to schedule.

## 2024-10-08 NOTE — TELEPHONE ENCOUNTER
Sandra [phone] Would like for nurse to return call. I want to know if I should stay on the same dose of Coumadin and return visit in a month. Would like a return call. Thanks Chanda

## 2024-10-09 ENCOUNTER — APPOINTMENT (OUTPATIENT)
Dept: HEMATOLOGY/ONCOLOGY | Age: 74
End: 2024-10-09
Attending: INTERNAL MEDICINE
Payer: MEDICARE

## 2024-11-06 ENCOUNTER — ANTI-COAG VISIT (OUTPATIENT)
Dept: HEMATOLOGY/ONCOLOGY | Age: 74
End: 2024-11-06
Attending: INTERNAL MEDICINE
Payer: MEDICARE

## 2024-11-06 ENCOUNTER — TELEPHONE (OUTPATIENT)
Dept: HEMATOLOGY/ONCOLOGY | Facility: HOSPITAL | Age: 74
End: 2024-11-06

## 2024-11-06 DIAGNOSIS — I26.09 OTHER ACUTE PULMONARY EMBOLISM WITH ACUTE COR PULMONALE (HCC): ICD-10-CM

## 2024-11-06 LAB
INR BLD: 2.43 (ref 0.8–1.2)
PROTHROMBIN TIME: 26 SECONDS (ref 11.6–14.8)

## 2024-11-06 PROCEDURE — 85610 PROTHROMBIN TIME: CPT

## 2024-11-06 PROCEDURE — 36415 COLL VENOUS BLD VENIPUNCTURE: CPT

## 2024-11-06 NOTE — TELEPHONE ENCOUNTER
Left voicemail to have patient call back regarding INR. Unable to leave detailed message d/t no patient identifiers. Will send [employer] message.

## 2024-11-12 DIAGNOSIS — I26.09 OTHER ACUTE PULMONARY EMBOLISM WITH ACUTE COR PULMONALE (HCC): ICD-10-CM

## 2024-11-12 RX ORDER — WARFARIN SODIUM 2.5 MG/1
TABLET ORAL
Qty: 180 TABLET | Refills: 1 | Status: SHIPPED | OUTPATIENT
Start: 2024-11-12

## 2024-12-04 ENCOUNTER — ANTI-COAG VISIT (OUTPATIENT)
Dept: HEMATOLOGY/ONCOLOGY | Age: 74
End: 2024-12-04
Attending: INTERNAL MEDICINE
Payer: MEDICARE

## 2024-12-04 DIAGNOSIS — I26.09 OTHER ACUTE PULMONARY EMBOLISM WITH ACUTE COR PULMONALE (HCC): ICD-10-CM

## 2024-12-04 LAB
INR BLD: 2.65 (ref 0.8–1.2)
PROTHROMBIN TIME: 27.8 SECONDS (ref 11.6–14.8)

## 2024-12-04 PROCEDURE — 85610 PROTHROMBIN TIME: CPT

## 2024-12-04 PROCEDURE — 36415 COLL VENOUS BLD VENIPUNCTURE: CPT

## 2025-01-03 ENCOUNTER — ANTI-COAG VISIT (OUTPATIENT)
Age: 75
End: 2025-01-03
Attending: INTERNAL MEDICINE
Payer: MEDICARE

## 2025-01-03 DIAGNOSIS — I26.09 OTHER ACUTE PULMONARY EMBOLISM WITH ACUTE COR PULMONALE (HCC): ICD-10-CM

## 2025-01-03 LAB
INR BLD: 2.01 (ref 0.8–1.2)
PROTHROMBIN TIME: 22.5 SECONDS (ref 11.6–14.8)

## 2025-01-31 ENCOUNTER — ANTI-COAG VISIT (OUTPATIENT)
Age: 75
End: 2025-01-31
Attending: INTERNAL MEDICINE
Payer: MEDICARE

## 2025-01-31 DIAGNOSIS — I26.09 OTHER ACUTE PULMONARY EMBOLISM WITH ACUTE COR PULMONALE (HCC): Primary | ICD-10-CM

## 2025-01-31 LAB
INR BLD: 1.71 (ref 0.8–1.2)
PROTHROMBIN TIME: 19.8 SECONDS (ref 11.6–14.8)

## 2025-02-10 ENCOUNTER — ANTI-COAG VISIT (OUTPATIENT)
Age: 75
End: 2025-02-10
Attending: INTERNAL MEDICINE
Payer: MEDICARE

## 2025-02-10 ENCOUNTER — TELEPHONE (OUTPATIENT)
Dept: HEMATOLOGY/ONCOLOGY | Facility: HOSPITAL | Age: 75
End: 2025-02-10

## 2025-02-10 DIAGNOSIS — I26.09 OTHER ACUTE PULMONARY EMBOLISM WITH ACUTE COR PULMONALE (HCC): ICD-10-CM

## 2025-02-10 LAB
INR BLD: 1.79 (ref 0.8–1.2)
INR: 1.79 (ref 0.8–1.2)
PROTHROMBIN TIME: 20.5 SECONDS (ref 11.6–14.8)

## 2025-02-10 NOTE — TELEPHONE ENCOUNTER
Heather calling in her INR today 1.79. please call her back on her cell or my chart. Thank you marla

## 2025-02-11 ENCOUNTER — ANTI-COAG VISIT (OUTPATIENT)
Age: 75
End: 2025-02-11

## 2025-02-11 DIAGNOSIS — I26.09 OTHER ACUTE PULMONARY EMBOLISM WITH ACUTE COR PULMONALE (HCC): Primary | ICD-10-CM

## 2025-02-17 ENCOUNTER — ANTI-COAG VISIT (OUTPATIENT)
Age: 75
End: 2025-02-17
Attending: INTERNAL MEDICINE
Payer: MEDICARE

## 2025-02-17 ENCOUNTER — TELEPHONE (OUTPATIENT)
Age: 75
End: 2025-02-17

## 2025-02-17 DIAGNOSIS — I26.09 OTHER ACUTE PULMONARY EMBOLISM WITH ACUTE COR PULMONALE (HCC): ICD-10-CM

## 2025-02-17 LAB
INR BLD: 1.62 (ref 0.8–1.2)
PROTHROMBIN TIME: 18.9 SECONDS (ref 11.6–14.8)

## 2025-02-17 NOTE — TELEPHONE ENCOUNTER
----- Message from Reyna MILLER sent at 2/17/2025  8:44 AM CST -----  Regarding: PT/INR  Any Missed Doses?   No    Antibiotic or Alcohol Intake? No    Refill needed? No    Current Dose: 2.5 mg on Tues.  5 mg all other days.    Active on MyChart: ?

## 2025-02-17 NOTE — TELEPHONE ENCOUNTER
Called patient regarding INR 1.62. Patient to increase 10 % 7.5 mg Tuesday/Thursday/Saturday 5 mg all other days, recheck in 10 days. Patient conveyed understanding.

## 2025-02-26 ENCOUNTER — TELEPHONE (OUTPATIENT)
Age: 75
End: 2025-02-26

## 2025-02-26 ENCOUNTER — ANTI-COAG VISIT (OUTPATIENT)
Age: 75
End: 2025-02-26
Attending: INTERNAL MEDICINE
Payer: MEDICARE

## 2025-02-26 DIAGNOSIS — I26.09 OTHER ACUTE PULMONARY EMBOLISM WITH ACUTE COR PULMONALE (HCC): ICD-10-CM

## 2025-02-26 LAB
INR BLD: 2.89 (ref 0.8–1.2)
PROTHROMBIN TIME: 29.8 SECONDS (ref 11.6–14.8)

## 2025-02-26 NOTE — TELEPHONE ENCOUNTER
INR 2.89 no change in dose, repeat INR in 1 week. Called patient and she conveyed understanding.

## 2025-02-26 NOTE — TELEPHONE ENCOUNTER
----- Message from Reyna MILLER sent at 2/26/2025  8:42 AM CST -----  Regarding: PT/INR  Any Missed Doses?   No    Antibiotic or Alcohol Intake? No    Refill needed? Yes    Current Dose: 7.5 mg on Tue. Thurs. & Sat.  5 mg all other days.    Active on MyChart: Yes

## 2025-02-28 ENCOUNTER — APPOINTMENT (OUTPATIENT)
Age: 75
End: 2025-02-28
Attending: INTERNAL MEDICINE
Payer: MEDICARE

## 2025-02-28 DIAGNOSIS — I26.09 OTHER ACUTE PULMONARY EMBOLISM WITH ACUTE COR PULMONALE (HCC): ICD-10-CM

## 2025-02-28 NOTE — TELEPHONE ENCOUNTER
Patient need a refill on her Warfarin 2.5 mg oral tablet. She is almost out of medication. She stated that she may need an increase on the Warfarin the way she is instructed to take them.   Please send to:   [id number] [address] AT [facility] [address], [phone], [phone]   She need it by Monday 3/3/25.

## 2025-03-03 RX ORDER — WARFARIN SODIUM 2.5 MG/1
TABLET ORAL
Qty: 180 TABLET | Refills: 1 | Status: SHIPPED | OUTPATIENT
Start: 2025-03-03 | End: 2025-04-25

## 2025-03-07 ENCOUNTER — ANTI-COAG VISIT (OUTPATIENT)
Age: 75
End: 2025-03-07
Attending: INTERNAL MEDICINE
Payer: MEDICARE

## 2025-03-07 ENCOUNTER — TELEPHONE (OUTPATIENT)
Age: 75
End: 2025-03-07

## 2025-03-07 DIAGNOSIS — I26.09 OTHER ACUTE PULMONARY EMBOLISM WITH ACUTE COR PULMONALE (HCC): Primary | ICD-10-CM

## 2025-03-07 LAB
INR BLD: 2.92 (ref 0.8–1.2)
PROTHROMBIN TIME: 30 SECONDS (ref 11.6–14.8)

## 2025-03-07 NOTE — TELEPHONE ENCOUNTER
Spoke with pts  Bruce regarding INR 2.92. no change in does repeat INR in 2 weeks. Bruce conveyed understanding.

## 2025-03-17 ENCOUNTER — ANTI-COAG VISIT (OUTPATIENT)
Age: 75
End: 2025-03-17
Attending: INTERNAL MEDICINE
Payer: MEDICARE

## 2025-03-17 ENCOUNTER — TELEPHONE (OUTPATIENT)
Age: 75
End: 2025-03-17

## 2025-03-17 DIAGNOSIS — I26.09 OTHER ACUTE PULMONARY EMBOLISM WITH ACUTE COR PULMONALE (HCC): ICD-10-CM

## 2025-03-17 LAB
INR BLD: 3.01 (ref 0.8–1.2)
PROTHROMBIN TIME: 30.8 SECONDS (ref 11.6–14.8)

## 2025-03-17 NOTE — TELEPHONE ENCOUNTER
Called patient regarding INR of 3.01. No change in dose, repeat INR in 2 weeks. Patient conveyed understanding.

## 2025-03-31 ENCOUNTER — TELEPHONE (OUTPATIENT)
Age: 75
End: 2025-03-31

## 2025-03-31 ENCOUNTER — ANTI-COAG VISIT (OUTPATIENT)
Age: 75
End: 2025-03-31
Attending: INTERNAL MEDICINE
Payer: MEDICARE

## 2025-03-31 DIAGNOSIS — I26.09 OTHER ACUTE PULMONARY EMBOLISM WITH ACUTE COR PULMONALE (HCC): Primary | ICD-10-CM

## 2025-03-31 LAB
INR BLD: 3.31 (ref 0.8–1.2)
PROTHROMBIN TIME: 33.1 SECONDS (ref 11.6–14.8)

## 2025-03-31 NOTE — TELEPHONE ENCOUNTER
Called patient and spoke to . Patient not home, I will return call in hour and send Mychart message regarding INR.

## 2025-04-07 ENCOUNTER — ANTI-COAG VISIT (OUTPATIENT)
Age: 75
End: 2025-04-07
Attending: INTERNAL MEDICINE
Payer: MEDICARE

## 2025-04-07 ENCOUNTER — TELEPHONE (OUTPATIENT)
Age: 75
End: 2025-04-07

## 2025-04-07 DIAGNOSIS — I26.09 OTHER ACUTE PULMONARY EMBOLISM WITH ACUTE COR PULMONALE (HCC): ICD-10-CM

## 2025-04-07 LAB
INR BLD: 2.9 (ref 0.8–1.2)
PROTHROMBIN TIME: 29.9 SECONDS (ref 11.6–14.8)

## 2025-04-07 NOTE — TELEPHONE ENCOUNTER
Called patien regarding INR 2.90 no change in dose repeat INR in 2 weeks. Patient conveyed understanding.

## 2025-04-14 ENCOUNTER — APPOINTMENT (OUTPATIENT)
Age: 75
End: 2025-04-14
Attending: INTERNAL MEDICINE
Payer: MEDICARE

## 2025-04-21 ENCOUNTER — TELEPHONE (OUTPATIENT)
Age: 75
End: 2025-04-21

## 2025-04-21 ENCOUNTER — ANTI-COAG VISIT (OUTPATIENT)
Age: 75
End: 2025-04-21
Attending: INTERNAL MEDICINE
Payer: MEDICARE

## 2025-04-21 DIAGNOSIS — I26.09 OTHER ACUTE PULMONARY EMBOLISM WITH ACUTE COR PULMONALE (HCC): ICD-10-CM

## 2025-04-21 LAB
INR BLD: 2.94 (ref 0.8–1.2)
PROTHROMBIN TIME: 30.2 SECONDS (ref 11.6–14.8)

## 2025-04-21 NOTE — TELEPHONE ENCOUNTER
Called patient rega INR 2.94 no change in dose repeat INR in 2 weeks. Patient conveyed understanding.

## 2025-04-25 DIAGNOSIS — I26.09 OTHER ACUTE PULMONARY EMBOLISM WITH ACUTE COR PULMONALE (HCC): ICD-10-CM

## 2025-04-25 RX ORDER — WARFARIN SODIUM 2.5 MG/1
TABLET ORAL
Qty: 180 TABLET | Refills: 1 | Status: SHIPPED | OUTPATIENT
Start: 2025-04-25 | End: 2025-05-23

## 2025-05-05 ENCOUNTER — TELEPHONE (OUTPATIENT)
Age: 75
End: 2025-05-05

## 2025-05-05 ENCOUNTER — ANTI-COAG VISIT (OUTPATIENT)
Age: 75
End: 2025-05-05
Attending: INTERNAL MEDICINE
Payer: MEDICARE

## 2025-05-05 DIAGNOSIS — I26.09 OTHER ACUTE PULMONARY EMBOLISM WITH ACUTE COR PULMONALE (HCC): ICD-10-CM

## 2025-05-05 LAB
INR BLD: 2.54 (ref 0.8–1.2)
PROTHROMBIN TIME: 26.9 SECONDS (ref 11.6–14.8)

## 2025-05-05 NOTE — TELEPHONE ENCOUNTER
Called patient regarding INR of 2.54. Patient to continue with same dose of coumadin and repeat INR in 4 weeks. Patient conveyed understanding.

## 2025-05-05 NOTE — TELEPHONE ENCOUNTER
----- Message from Leyla VELAZQUEZ sent at 5/5/2025  8:08 AM CDT -----  Any Missed Doses?   noAntibiotic or Alcohol Intake? No antibiotics or alcoholRefill needed? Yes took 13 pills from . Current Dose: sat 7.5mg and the rest 5mg. Patient would like 5mg tabs as well as 2.5mg tabs. She has none at homeActive on MyChart:

## 2025-05-06 ENCOUNTER — TELEPHONE (OUTPATIENT)
Age: 75
End: 2025-05-06

## 2025-05-06 NOTE — TELEPHONE ENCOUNTER
Returned patient's call and confirmed that her warfarin 2.5 mg prescription was sent. Patient will call and reschedule her f/u with Dr Canales.

## 2025-05-06 NOTE — TELEPHONE ENCOUNTER
Pt called she stated her appointment  for 5/16 was cancelled and she would like to know if she should reschedule or not       Please give pt a call back     Thank you

## 2025-05-08 ENCOUNTER — TELEPHONE (OUTPATIENT)
Age: 75
End: 2025-05-08

## 2025-05-08 NOTE — TELEPHONE ENCOUNTER
Pt calling and states her prescription warfarin should be for 180 pills but the dosage was not changed on script so pharmacy only gave her 130 pills.    Please contact pharmacy and patient.

## 2025-05-09 NOTE — TELEPHONE ENCOUNTER
Unable to leave voicemail d/t no patient identifiers. Will send [employer] message.

## 2025-05-16 ENCOUNTER — APPOINTMENT (OUTPATIENT)
Dept: HEMATOLOGY/ONCOLOGY | Age: 75
End: 2025-05-16
Attending: INTERNAL MEDICINE
Payer: MEDICARE

## 2025-05-19 ENCOUNTER — APPOINTMENT (OUTPATIENT)
Age: 75
End: 2025-05-19
Attending: INTERNAL MEDICINE
Payer: MEDICARE

## 2025-05-23 DIAGNOSIS — I26.09 OTHER ACUTE PULMONARY EMBOLISM WITH ACUTE COR PULMONALE (HCC): ICD-10-CM

## 2025-05-23 RX ORDER — WARFARIN SODIUM 2.5 MG/1
TABLET ORAL
Qty: 180 TABLET | Refills: 1 | Status: SHIPPED | OUTPATIENT
Start: 2025-05-23

## 2025-05-23 RX ORDER — WARFARIN SODIUM 2.5 MG/1
TABLET ORAL
Qty: 180 TABLET | Refills: 1 | Status: CANCELLED | OUTPATIENT
Start: 2025-05-23

## 2025-06-02 ENCOUNTER — APPOINTMENT (OUTPATIENT)
Age: 75
End: 2025-06-02
Attending: INTERNAL MEDICINE
Payer: MEDICARE

## 2025-06-04 ENCOUNTER — ANTI-COAG VISIT (OUTPATIENT)
Age: 75
End: 2025-06-04
Attending: INTERNAL MEDICINE
Payer: MEDICARE

## 2025-06-04 ENCOUNTER — TELEPHONE (OUTPATIENT)
Age: 75
End: 2025-06-04

## 2025-06-04 DIAGNOSIS — I26.09 OTHER ACUTE PULMONARY EMBOLISM WITH ACUTE COR PULMONALE (HCC): Primary | ICD-10-CM

## 2025-06-04 LAB
INR BLD: 2.24 (ref 0.8–1.2)
PROTHROMBIN TIME: 24.4 SECONDS (ref 11.6–14.8)

## 2025-06-04 NOTE — TELEPHONE ENCOUNTER
Called patient regarding INR of 2.4. patient to continue same dose and repeat INR in 4 weeks. Patient conveyed understanding.

## 2025-06-30 ENCOUNTER — ANTI-COAG VISIT (OUTPATIENT)
Age: 75
End: 2025-06-30
Attending: INTERNAL MEDICINE
Payer: MEDICARE

## 2025-06-30 DIAGNOSIS — I26.09 OTHER ACUTE PULMONARY EMBOLISM WITH ACUTE COR PULMONALE (HCC): ICD-10-CM

## 2025-06-30 LAB
INR BLD: 2.38 (ref 0.8–1.2)
PROTHROMBIN TIME: 25.6 SECONDS (ref 11.6–14.8)

## 2025-07-28 ENCOUNTER — APPOINTMENT (OUTPATIENT)
Facility: LOCATION | Age: 75
End: 2025-07-28
Attending: INTERNAL MEDICINE
Payer: MEDICARE

## 2025-07-31 ENCOUNTER — TELEPHONE (OUTPATIENT)
Facility: LOCATION | Age: 75
End: 2025-07-31

## 2025-07-31 ENCOUNTER — ANTI-COAG VISIT (OUTPATIENT)
Facility: LOCATION | Age: 75
End: 2025-07-31
Attending: INTERNAL MEDICINE
Payer: MEDICARE

## 2025-07-31 DIAGNOSIS — I26.09 OTHER ACUTE PULMONARY EMBOLISM WITH ACUTE COR PULMONALE (HCC): ICD-10-CM

## 2025-07-31 LAB
INR BLD: 2.8 (ref 0.8–1.2)
PROTHROMBIN TIME: 29.1 SECONDS (ref 11.6–14.8)

## 2025-08-25 ENCOUNTER — ANTI-COAG VISIT (OUTPATIENT)
Facility: LOCATION | Age: 75
End: 2025-08-25
Attending: INTERNAL MEDICINE

## 2025-08-25 ENCOUNTER — TELEPHONE (OUTPATIENT)
Facility: LOCATION | Age: 75
End: 2025-08-25

## 2025-08-25 DIAGNOSIS — I26.09 OTHER ACUTE PULMONARY EMBOLISM WITH ACUTE COR PULMONALE (HCC): ICD-10-CM

## 2025-08-25 LAB
INR BLD: 5.5 (ref 0.8–1.2)
PROTHROMBIN TIME: 49.4 SECONDS (ref 11.6–14.8)

## 2025-08-29 ENCOUNTER — ANTI-COAG VISIT (OUTPATIENT)
Facility: LOCATION | Age: 75
End: 2025-08-29
Attending: INTERNAL MEDICINE

## 2025-08-29 ENCOUNTER — TELEPHONE (OUTPATIENT)
Facility: LOCATION | Age: 75
End: 2025-08-29

## 2025-08-29 DIAGNOSIS — I26.09 OTHER ACUTE PULMONARY EMBOLISM WITH ACUTE COR PULMONALE (HCC): ICD-10-CM

## 2025-08-29 LAB
INR BLD: 6.4 (ref 0.8–1.2)
PROTHROMBIN TIME: 55.6 SECONDS (ref 11.6–14.8)

## (undated) DIAGNOSIS — I26.09 OTHER ACUTE PULMONARY EMBOLISM WITH ACUTE COR PULMONALE (HCC): ICD-10-CM

## (undated) NOTE — LETTER
[facility] [address], [address]  Consent for Procedure/Sedation    Date:September 33,5639    Time: 1430      1.  I authorize the performance upon [place]uss the following:cardiac catheterization, left ventricular cineangiog Signature of Patient: ____________________________________________________    Signature of person authorized                                     Relationship to  to consent for patient: _________________________ patient: ___________________    Witness: ___